# Patient Record
Sex: FEMALE | Race: WHITE | Employment: FULL TIME | URBAN - METROPOLITAN AREA
[De-identification: names, ages, dates, MRNs, and addresses within clinical notes are randomized per-mention and may not be internally consistent; named-entity substitution may affect disease eponyms.]

---

## 2017-02-21 ENCOUNTER — OFFICE VISIT (OUTPATIENT)
Dept: PEDIATRICS | Facility: CLINIC | Age: 52
End: 2017-02-21
Payer: COMMERCIAL

## 2017-02-21 VITALS
HEIGHT: 64 IN | WEIGHT: 129.1 LBS | HEART RATE: 73 BPM | DIASTOLIC BLOOD PRESSURE: 70 MMHG | TEMPERATURE: 97 F | BODY MASS INDEX: 22.04 KG/M2 | SYSTOLIC BLOOD PRESSURE: 130 MMHG

## 2017-02-21 DIAGNOSIS — Z00.00 ENCOUNTER FOR ROUTINE ADULT HEALTH EXAMINATION WITHOUT ABNORMAL FINDINGS: Primary | ICD-10-CM

## 2017-02-21 DIAGNOSIS — Z13.6 CARDIOVASCULAR SCREENING; LDL GOAL LESS THAN 160: ICD-10-CM

## 2017-02-21 DIAGNOSIS — F41.8 DEPRESSION WITH ANXIETY: ICD-10-CM

## 2017-02-21 DIAGNOSIS — Z12.11 SCREENING FOR COLON CANCER: ICD-10-CM

## 2017-02-21 DIAGNOSIS — Z11.59 NEED FOR HEPATITIS C SCREENING TEST: ICD-10-CM

## 2017-02-21 DIAGNOSIS — Z12.4 SCREENING FOR MALIGNANT NEOPLASM OF CERVIX: ICD-10-CM

## 2017-02-21 DIAGNOSIS — Z13.1 SCREENING FOR DIABETES MELLITUS: ICD-10-CM

## 2017-02-21 DIAGNOSIS — L71.9 ROSACEA: ICD-10-CM

## 2017-02-21 DIAGNOSIS — Z13.29 SCREENING FOR THYROID DISORDER: ICD-10-CM

## 2017-02-21 DIAGNOSIS — Z12.31 ENCOUNTER FOR SCREENING MAMMOGRAM FOR BREAST CANCER: ICD-10-CM

## 2017-02-21 PROCEDURE — 87624 HPV HI-RISK TYP POOLED RSLT: CPT | Performed by: NURSE PRACTITIONER

## 2017-02-21 PROCEDURE — 99213 OFFICE O/P EST LOW 20 MIN: CPT | Mod: 25 | Performed by: NURSE PRACTITIONER

## 2017-02-21 PROCEDURE — 99396 PREV VISIT EST AGE 40-64: CPT | Performed by: NURSE PRACTITIONER

## 2017-02-21 PROCEDURE — G0145 SCR C/V CYTO,THINLAYER,RESCR: HCPCS | Performed by: NURSE PRACTITIONER

## 2017-02-21 RX ORDER — BUPROPION HYDROCHLORIDE 150 MG/1
150 TABLET ORAL EVERY MORNING
Qty: 30 TABLET | Refills: 1 | Status: SHIPPED | OUTPATIENT
Start: 2017-02-21 | End: 2017-06-15

## 2017-02-21 RX ORDER — DOXYCYCLINE 100 MG/1
100 CAPSULE ORAL DAILY
Qty: 90 CAPSULE | Refills: 3 | Status: SHIPPED | OUTPATIENT
Start: 2017-02-21 | End: 2018-03-25

## 2017-02-21 ASSESSMENT — ANXIETY QUESTIONNAIRES
GAD7 TOTAL SCORE: 11
IF YOU CHECKED OFF ANY PROBLEMS ON THIS QUESTIONNAIRE, HOW DIFFICULT HAVE THESE PROBLEMS MADE IT FOR YOU TO DO YOUR WORK, TAKE CARE OF THINGS AT HOME, OR GET ALONG WITH OTHER PEOPLE: VERY DIFFICULT
1. FEELING NERVOUS, ANXIOUS, OR ON EDGE: NEARLY EVERY DAY
7. FEELING AFRAID AS IF SOMETHING AWFUL MIGHT HAPPEN: MORE THAN HALF THE DAYS
6. BECOMING EASILY ANNOYED OR IRRITABLE: SEVERAL DAYS
5. BEING SO RESTLESS THAT IT IS HARD TO SIT STILL: NOT AT ALL
2. NOT BEING ABLE TO STOP OR CONTROL WORRYING: MORE THAN HALF THE DAYS
3. WORRYING TOO MUCH ABOUT DIFFERENT THINGS: MORE THAN HALF THE DAYS

## 2017-02-21 ASSESSMENT — PATIENT HEALTH QUESTIONNAIRE - PHQ9: 5. POOR APPETITE OR OVEREATING: SEVERAL DAYS

## 2017-02-21 NOTE — PATIENT INSTRUCTIONS
It was a pleasure seeing you today at the [facility]. Thank you for allowing us to care for you today. We truly hope we provided you with the excellent service you deserve. Please let us know if there is anything else we can do for you so we can be sure you are leaving completley satisfied with your care experience.       General information about your clinic   Clinic Hours Lab Hours (Appointments are required)   Mon-Thurs: 7:30 AM - 7 PM Mon-Thurs: 7:30 AM - 7 PM   Fri: 7:30 AM - 5 PM Fri: 7:30 AM - 5 PM        After Hours Nurse Advise & Appts:  [facility] Nurse Advisors: [phone]  [facility] On Call: to make appointments anytime: [phone] On Call Physician: call [phone] and answering service will page the on call physician.        For urgent appointments, please call [phone] and ask for the triage nurse or your care team clinic nurse.  How to contact my care team:  [facility]: [email or web address]   Phone: [phone]   Fax: [phone]       [facility]:   Phone: [phone]  Hours: 8:00 AM - 6:00 PM  Medication Refills:  Call your pharmacy and they will forward the refill to us. Please allow 3 business days for your refills to be completed.       Normal or non-critical lab and imaging results will be communicated to you by MyChart, letter or phone within 7 days.  If you do not hear from us within 10 days, please call the clinic. If you have a critical or abnormal lab result, we will notify you by phone as soon as possible.       We now have PWIC (Pediatric Walk in Care)  Monday-Friday from 7:30-4. Simply walk in and be seen for your urgent needs like cough, fever, rash, diarrhea or vomiting, pink eye, UTI. No appointments needed. Ask one of the team for more information      -Your Care Team:    Dr. Rogelio Angel - Internal Medicine/Pediatrics   Dr. Cruz Olivas - Family Medicine  Dr. Carlee San - Pediatrics  Zoraida Leal CNP - Family Practice Nurse  Practitioner         PLAN:   1.   Symptomatic therapy suggested:   Will start on the Wellbutrin once a day.  2.  Orders Placed This Encounter   Medications     buPROPion (WELLBUTRIN XL) 150 MG 24 hr tablet     Sig: Take 1 tablet (150 mg) by mouth every morning     Dispense:  30 tablet     Refill:  1     doxycycline (VIBRAMYCIN) 100 MG capsule     Sig: Take 1 capsule (100 mg) by mouth daily     Dispense:  90 capsule     Refill:  3     Orders Placed This Encounter   Procedures     MA Screening Digital Bilateral     Pap imaged thin layer screen with HPV - recommended age 30 - 65 years (select HPV order below)     HPV High Risk Types DNA Cervical     Lipid panel reflex to direct LDL     Comprehensive metabolic panel     Vitamin D Deficiency     Hepatitis C Screen Reflex to HCV RNA Quant and Genotype     TSH with free T4 reflex     JUST IN CASE     MENTAL HEALTH REFERRAL     GASTROENTEROLOGY ADULT REF PROCEDURE ONLY       3. Patient needs to follow up in if no improvement,or sooner if worsening of symptoms or other symptoms develop.  FURTHER TESTING:       - mammogram  CONSULTATION/REFERRAL to Counseling and for colonoscopy  Please call [phone] to make appointment  if you do not hear from referrals in the next few days.   FUTURE LABS:       - Schedule a fasting blood draw   Follow up in 1 month.  Follow up office visit in one year for annual health maintenance exam, sooner PRN.    Preventive Health Recommendations  Female Ages 50 - 64    Yearly exam: See your health care provider every year in order to  o Review health changes.   o Discuss preventive care.    o Review your medicines if your doctor has prescribed any.      Get a Pap test every three years (unless you have an abnormal result and your provider advises testing more often).    If you get Pap tests with HPV test, you only need to test every 5 years, unless you have an abnormal result.     You do not need a Pap test if your uterus was removed  (hysterectomy) and you have not had cancer.    You should be tested each year for STDs (sexually transmitted diseases) if you're at risk.     Have a mammogram every 1 to 2 years.    Have a colonoscopy at age 50, or have a yearly FIT test (stool test). These exams screen for colon cancer.      Have a cholesterol test every 5 years, or more often if advised.    Have a diabetes test (fasting glucose) every three years. If you are at risk for diabetes, you should have this test more often.     If you are at risk for osteoporosis (brittle bone disease), think about having a bone density scan (DEXA).    Shots: Get a flu shot each year. Get a tetanus shot every 10 years.    Nutrition:     Eat at least 5 servings of fruits and vegetables each day.    Eat whole-grain bread, whole-wheat pasta and brown rice instead of white grains and rice.    Talk to your provider about Calcium and Vitamin D.     Lifestyle    Exercise at least 150 minutes a week (30 minutes a day, 5 days a week). This will help you control your weight and prevent disease.    Limit alcohol to one drink per day.    No smoking.     Wear sunscreen to prevent skin cancer.     See your dentist every six months for an exam and cleaning.    See your eye doctor every 1 to 2 years.

## 2017-02-21 NOTE — LETTER
[address] [address]  [phone]      February 27, 2017    Minerva Rodríguez  [address] [place] [address] [address] [place] [address]    Dear Minerva,  We are happy to inform you that your PAP smear result from 2/21/17 is normal.  We are now able to do a follow up test on PAP smears. The DNA test is for HPV (Human Papilloma Virus). Cervical cancer is closely linked with certain types of HPV. Your result showed no evidence of high risk HPV.  Therefore we recommend you return in 3 years for your next pap smear.  You will still need to return to the clinic every year for an annual exam and other preventive tests.  Please contact the clinic with any questions.  Sincerely,  ANGI Simon CNP/rlm

## 2017-02-21 NOTE — MR AVS SNAPSHOT
After Visit Summary   2/21/2017    Minerva Rodríguez    MRN: [id number]           Patient Information     Date Of Birth          1965        Visit Information        Provider Department      2/21/2017 11:00 AM Zoraida Leal APRN CNP [facility]        Today's Diagnoses     Encounter for routine adult health examination without abnormal findings    -  1    Encounter for screening mammogram for breast cancer        Need for hepatitis C screening test        Screening for malignant neoplasm of cervix        Screening for diabetes mellitus        CARDIOVASCULAR SCREENING; LDL GOAL LESS THAN 160        Screening for colon cancer        Screening for thyroid disorder        Depression with anxiety        Rosacea          Care Instructions    It was a pleasure seeing you today at the [facility]. Thank you for allowing us to care for you today. We truly hope we provided you with the excellent service you deserve. Please let us know if there is anything else we can do for you so we can be sure you are leaving completley satisfied with your care experience.       General information about your clinic   Clinic Hours Lab Hours (Appointments are required)   Mon-Thurs: 7:30 AM - 7 PM Mon-Thurs: 7:30 AM - 7 PM   Fri: 7:30 AM - 5 PM Fri: 7:30 AM - 5 PM        After Hours Nurse Advise & Appts:  [facility] Nurse Advisors: [phone]  [facility] On Call: to make appointments anytime: [phone] On Call Physician: call [phone] and answering service will page the on call physician.        For urgent appointments, please call [phone] and ask for the triage nurse or your care team clinic nurse.  How to contact my care team:  MyChart: [email or web address]   Phone: [phone]   Fax: [phone]       [facility]:   Phone: [phone]  Hours: 8:00 AM - 6:00 PM  Medication Refills:  Call your pharmacy and they will forward the refill to us. Please  allow 3 business days for your refills to be completed.       Normal or non-critical lab and imaging results will be communicated to you by MyChart, letter or phone within 7 days.  If you do not hear from us within 10 days, please call the clinic. If you have a critical or abnormal lab result, we will notify you by phone as soon as possible.       We now have PWIC (Pediatric Walk in Care)  Monday-Friday from 7:30-4. Simply walk in and be seen for your urgent needs like cough, fever, rash, diarrhea or vomiting, pink eye, UTI. No appointments needed. Ask one of the team for more information      -Your Care Team:    Dr. Rogelio Angel - Internal Medicine/Pediatrics   Dr. Cruz Olivas - Family Medicine  Dr. Carlee San - Pediatrics  Zoraida Leal CNP - Family Practice Nurse Practitioner         PLAN:   1.   Symptomatic therapy suggested:   Will start on the Wellbutrin once a day.  2.  Orders Placed This Encounter   Medications     buPROPion (WELLBUTRIN XL) 150 MG 24 hr tablet     Sig: Take 1 tablet (150 mg) by mouth every morning     Dispense:  30 tablet     Refill:  1     doxycycline (VIBRAMYCIN) 100 MG capsule     Sig: Take 1 capsule (100 mg) by mouth daily     Dispense:  90 capsule     Refill:  3     Orders Placed This Encounter   Procedures     MA Screening Digital Bilateral     Pap imaged thin layer screen with HPV - recommended age 30 - 65 years (select HPV order below)     HPV High Risk Types DNA Cervical     Lipid panel reflex to direct LDL     Comprehensive metabolic panel     Vitamin D Deficiency     Hepatitis C Screen Reflex to HCV RNA Quant and Genotype     TSH with free T4 reflex     JUST IN CASE     MENTAL HEALTH REFERRAL     GASTROENTEROLOGY ADULT REF PROCEDURE ONLY       3. Patient needs to follow up in if no improvement,or sooner if worsening of symptoms or other symptoms develop.  FURTHER TESTING:       - mammogram  CONSULTATION/REFERRAL to Counseling and for colonoscopy  Please call [phone] to  make appointment  if you do not hear from referrals in the next few days.   FUTURE LABS:       - Schedule a fasting blood draw   Follow up in 1 month.  Follow up office visit in one year for annual health maintenance exam, sooner PRN.    Preventive Health Recommendations  Female Ages 50 - 64    Yearly exam: See your health care provider every year in order to  o Review health changes.   o Discuss preventive care.    o Review your medicines if your doctor has prescribed any.      Get a Pap test every three years (unless you have an abnormal result and your provider advises testing more often).    If you get Pap tests with HPV test, you only need to test every 5 years, unless you have an abnormal result.     You do not need a Pap test if your uterus was removed (hysterectomy) and you have not had cancer.    You should be tested each year for STDs (sexually transmitted diseases) if you're at risk.     Have a mammogram every 1 to 2 years.    Have a colonoscopy at age 50, or have a yearly FIT test (stool test). These exams screen for colon cancer.      Have a cholesterol test every 5 years, or more often if advised.    Have a diabetes test (fasting glucose) every three years. If you are at risk for diabetes, you should have this test more often.     If you are at risk for osteoporosis (brittle bone disease), think about having a bone density scan (DEXA).    Shots: Get a flu shot each year. Get a tetanus shot every 10 years.    Nutrition:     Eat at least 5 servings of fruits and vegetables each day.    Eat whole-grain bread, whole-wheat pasta and brown rice instead of white grains and rice.    Talk to your provider about Calcium and Vitamin D.     Lifestyle    Exercise at least 150 minutes a week (30 minutes a day, 5 days a week). This will help you control your weight and prevent disease.    Limit alcohol to one drink per day.    No smoking.     Wear sunscreen to prevent skin cancer.     See your dentist every six  months for an exam and cleaning.    See your eye doctor every 1 to 2 years.          Follow-ups after your visit        Additional Services     GASTROENTEROLOGY ADULT REF PROCEDURE ONLY       Last Lab Result: No results found for: CR  Body mass index is 21.99 kg/(m^2).     Needed:  No  Language:  English    Patient will be contacted to schedule procedure.     Please be aware that coverage of these services is subject to the terms and limitations of your health insurance plan.  Call member services at your health plan with any benefit or coverage questions.  Any procedures must be performed at a [facility] facility OR coordinated by your clinic's referral office.    Please bring the following with you to your appointment:    (1) Any X-Rays, CTs or MRIs which have been performed.  Contact the facility where they were done to arrange for  prior to your scheduled appointment.    (2) List of current medications   (3) This referral request   (4) Any documents/labs given to you for this referral            MENTAL HEALTH REFERRAL       Your provider has referred you to: PREFERRED PROVIDERS:  [facility] Providers Intake Scheduling [phone]   [email or web address]  [facility]   All scheduling is subject to the client's specific insurance plan & benefits, provider/location availability, and provider clinical specialities.  Please arrive 15 minutes early for your first appointment and bring your completed paperwork.    Please be aware that coverage of these services is subject to the terms and limitations of your health insurance plan.  Call member services at your health plan with any benefit or coverage questions.                  Future tests that were ordered for you today     Open Future Orders        Priority Expected Expires Ordered    Lipid panel reflex to direct LDL Routine  3/21/2017 2/21/2017    Comprehensive metabolic panel Routine  3/21/2017 2/21/2017    Vitamin D Deficiency  Routine  3/21/2017 2017    Hepatitis C Screen Reflex to HCV RNA Quant and Genotype Routine  3/21/2017 2017    TSH with free T4 reflex Routine  3/21/2017 2017    JUST IN CASE Routine  3/21/2017 2017    MA Screening Digital Bilateral Routine  2018            Who to contact     If you have questions or need follow up information about today's clinic visit or your schedule please contact [facility] directly at [phone].  Normal or non-critical lab and imaging results will be communicated to you by Marqetahart, letter or phone within 4 business days after the clinic has received the results. If you do not hear from us within 7 days, please contact the clinic through [employer] or phone. If you have a critical or abnormal lab result, we will notify you by phone as soon as possible.  Submit refill requests through Zwittle or call your pharmacy and they will forward the refill request to us. Please allow 3 business days for your refill to be completed.          Additional Information About Your Visit        Zwittle Information     Zwittle is an electronic gateway that provides easy, online access to your medical records. With Zwittle, you can request a clinic appointment, read your test results, renew a prescription or communicate with your care team.     To sign up for Zwittle visit the website at [email or web address]   You will be asked to enter the access code listed below, as well as some personal information. Please follow the directions to create your username and password.     Your access code is: [email or web address]  Expires: 2017 11:57 AM     Your access code will  in 90 days. If you need help or a new code, please contact your [facility] or call [phone] for assistance.        Care EveryWhere ID     This is your Care EveryWhere ID. This could be used by other organizations to access your [facility]  "records  [id number]        Your Vitals Were     Pulse Temperature Height Breastfeeding? BMI (Body Mass Index)       73 97  F (36.1  C) (Temporal) 5' 4.25\" (1.632 m) No 21.99 kg/m2        Blood Pressure from Last 3 Encounters:   02/21/17 130/70   07/01/16 136/76   01/19/16 130/70    Weight from Last 3 Encounters:   02/21/17 129 lb 1.6 oz (58.6 kg)   07/01/16 129 lb 14.4 oz (58.9 kg)   01/19/16 130 lb 11.2 oz (59.3 kg)              We Performed the Following     GASTROENTEROLOGY ADULT REF PROCEDURE ONLY     HPV High Risk Types DNA Cervical     MENTAL HEALTH REFERRAL     Pap imaged thin layer screen with HPV - recommended age 30 - 65 years (select HPV order below)          Today's Medication Changes          These changes are accurate as of: 2/21/17 11:57 AM.  If you have any questions, ask your nurse or doctor.               Start taking these medicines.        Dose/Directions    buPROPion 150 MG 24 hr tablet   Commonly known as:  WELLBUTRIN XL   Used for:  Depression with anxiety   Started by:  Zoraida Leal APRN CNP        Dose:  150 mg   Take 1 tablet (150 mg) by mouth every morning   Quantity:  30 tablet   Refills:  1         These medicines have changed or have updated prescriptions.        Dose/Directions    doxycycline 100 MG capsule   Commonly known as:  VIBRAMYCIN   This may have changed:  See the new instructions.   Used for:  Rosacea   Changed by:  Zoraida Leal APRN CNP        Dose:  100 mg   Take 1 capsule (100 mg) by mouth daily   Quantity:  90 capsule   Refills:  3            Where to get your medicines      These medications were sent to [facility] [address] [address] [employer] - [facility] [address] [facility]  [address] [facility] [address]     Phone:  [phone]     buPROPion 150 MG 24 hr tablet    doxycycline 100 MG capsule                Primary Care Provider Office Phone # Fax #    Rogelio Angel [phone]       [facility] [address]  [facility] [place] [address]        Thank you!     Thank you for choosing [facility]  for your care. Our goal is always to provide you with excellent care. Hearing back from our patients is one way we can continue to improve our services. Please take a few minutes to complete the written survey that you may receive in the mail after your visit with us. Thank you!             Your Updated Medication List - Protect others around you: Learn how to safely use, store and throw away your medicines at www.disposemymeds.org.          This list is accurate as of: 2/21/17 11:57 AM.  Always use your most recent med list.                   Brand Name Dispense Instructions for use    buPROPion 150 MG 24 hr tablet    WELLBUTRIN XL    30 tablet    Take 1 tablet (150 mg) by mouth every morning       doxycycline 100 MG capsule    VIBRAMYCIN    90 capsule    Take 1 capsule (100 mg) by mouth daily

## 2017-02-21 NOTE — PROGRESS NOTES
SUBJECTIVE:     CC: Minerva Rodríguez is an 51 year old woman who presents for preventive health visit.     Healthy Habits:    Do you get at least three servings of calcium containing foods daily (dairy, green leafy vegetables, etc.)? yes    Amount of exercise or daily activities, outside of work: active at work    Problems taking medications regularly not applicable    Medication side effects: No    Have you had an eye exam in the past two years? no    Do you see a dentist twice per year? no  Do you have sleep apnea, excessive snoring or daytime drowsiness?no    PROBLEMS TO ADD ON...  Also, she has additional complaints of :  PROBLEMS TO ADD ON...    Depression and Anxiety Follow-Up    Status since last visit: Worsened     Was on medicine about a year ago and did not tolerate because of dizziness     Other associated symptoms:anxiety     Complicating factors:     Significant life event: No     Current substance abuse: Alcohol    PHQ-9 SCORE 1/19/2016   Total Score 14     BHARAT-7 SCORE 1/19/2016   Total Score 8        PHQ-9  English      PHQ-9   Any Language     GAD7       Today's PHQ-2 Score:   PHQ-2 ( 1999 [employer]) 2/21/2017 1/19/2016   Q1: Little interest or pleasure in doing things 3 3   Q2: Feeling down, depressed or hopeless 3 3   PHQ-2 Score 6 6       Abuse: Current or Past(Physical, Sexual or Emotional)- No  Do you feel safe in your environment - Yes    Social History   Substance Use Topics     Smoking status: Never Smoker     Smokeless tobacco: Never Used     Alcohol use Yes     The patient does not drink >3 drinks per day nor >7 drinks per week. patient note sometimes she will have more then 3 drinks a night, it varies.    No results for input(s): CHOL, HDL, LDL, TRIG, CHOLHDLRATIO, NHDL in the last 99064 hours.    Reviewed orders with patient.  Reviewed health maintenance and updated orders accordingly - Yes    Mammo Decision Support:  Patient over age 50, mutual decision to screen reflected in health  maintenance.    Pertinent mammograms are reviewed under the imaging tab.  History of abnormal Pap smear: NO - age 30- 65 PAP every 3 years recommended  All Histories reviewed and updated in Epic.  Past Medical History   Diagnosis Date     Alcohol dependence (H)      Depression with anxiety 1/19/2016     Hypertension 2/20/2009     Major depression in remission (H) 2/20/2009      Past Surgical History   Procedure Laterality Date     No history of surgery         ROS:  CONSTITUTIONAL:NEGATIVE for fever, chills, change in weight  INTEGUMENTARY/SKIN: NEGATIVE for worrisome rashes, moles or lesions. Has rosacea and responded well to doxycycline   EYES: NEGATIVE for vision changes or irritation  ENT: NEGATIVE for ear, mouth and throat problems  RESP:NEGATIVE for significant cough or SOB  BREAST: NEGATIVE for masses, tenderness or discharge  CV: NEGATIVE for chest pain, palpitations or peripheral edema  GI: NEGATIVE for nausea, abdominal pain, heartburn, or change in bowel habits   menopausal female: amenorrhea, no unusual urinary symptoms and no unusual vaginal symptoms  MUSCULOSKELETAL:NEGATIVE for significant arthralgias or myalgia  NEURO: NEGATIVE for weakness, dizziness or paresthesias  ENDOCRINE: NEGATIVE for temperature intolerance, skin/hair changes  HEME/ALLERGY/IMMUNE: NEGATIVE for allergies, anemia and bleeding disorder  PSYCHIATRIC: POSITIVE foralcohol abuse, anxiety, depressed mood and Hx depression and NEGATIVE fordrug usage, thoughts of hurting someone else and thoughts of self harm       Patient Active Problem List   Diagnosis     Depression with anxiety     Alcohol dependence (H)     Hypertension     Major depression in remission (H)     Past Surgical History   Procedure Laterality Date     No history of surgery         Social History   Substance Use Topics     Smoking status: Never Smoker     Smokeless tobacco: Never Used     Alcohol use 25.2 oz/week     42 Cans of beer per week     Family History  "  Problem Relation Age of Onset     Hypertension Father      Depression Father      Substance Abuse Father      Depression Brother      CANCER Maternal Aunt      Other Cancer Maternal Aunt      DIABETES No family hx of      Coronary Artery Disease No family hx of      Hyperlipidemia No family hx of      CEREBROVASCULAR DISEASE No family hx of      Breast Cancer No family hx of      Colon Cancer No family hx of      Asthma No family hx of      Thyroid Disease No family hx of          No current outpatient prescriptions on file.     No Known Allergies  OBJECTIVE:     /70 (BP Location: Right arm, Patient Position: Chair, Cuff Size: Adult Regular)  Pulse 73  Temp 97  F (36.1  C) (Temporal)  Ht 5' 4.25\" (1.632 m)  Wt 129 lb 1.6 oz (58.6 kg)  Breastfeeding? No  BMI 21.99 kg/m2  EXAM:  GENERAL: healthy, alert and no distress  EYES: Eyes grossly normal to inspection, PERRL and conjunctivae and sclerae normal  HENT: ear canals and TM's normal, nose and mouth without ulcers or lesions  NECK: no adenopathy, no asymmetry, masses, or scars and thyroid normal to palpation  RESP: lungs clear to auscultation - no rales, rhonchi or wheezes  BREAST: normal without masses, tenderness or nipple discharge and no palpable axillary masses or adenopathy  CV: regular rate and rhythm, normal S1 S2, no S3 or S4, no murmur, click or rub, no peripheral edema and peripheral pulses strong  ABDOMEN: soft, nontender, no hepatosplenomegaly, no masses and bowel sounds normal  MS: no gross musculoskeletal defects noted, no edema  SKIN: negatives: color normal, mobility and turgor normal, positives: SKIN: Diffuse erythema and dilation of superficial vessels on nose and cheeks.  Papules not  present.  NEURO: Normal strength and tone, mentation intact and speech normal  PSYCH: mentation appears normal, affect normal/bright  LYMPH: no cervical, supraclavicular, axillary, or inguinal adenopathy    ASSESSMENT/PLAN:     Minerva was seen today for " physical.    Diagnoses and all orders for this visit:    Encounter for routine adult health examination without abnormal findings  -     Lipid panel reflex to direct LDL; Future  -     Comprehensive metabolic panel; Future  -     Vitamin D Deficiency; Future  -     TSH with free T4 reflex; Future  -     JUST IN CASE; Future    Depression with anxiety  -     buPROPion (WELLBUTRIN XL) 150 MG 24 hr tablet; Take 1 tablet (150 mg) by mouth every morning  -     MENTAL HEALTH REFERRAL  Long discussion regarding the nature of depression including the internal vs external triggers.  Discussed risks, benefits, side effects, and alternatives of therapy. Would continue the present course of therapy.  Discussed the idea of a trial off meds at some point, usually in the spring for most chance of success.  Pt would like to continue on meds for now.  Initiate medication with Wellbutrin   Reviewed concept of depression as function of biochemical imbalance of neurotransmitters/rationale for treatment.  Risks and benefits of medication(s) reviewed with patient.  Questions answered.  Counseling advised  Refer to psychologist  Followup appointment in 1 month(s)  Patient instructed to call for significant side effects medications or problems  Patient advised immediate presentation to hospital for suicidal thought, etc.  No-harm verbal contract agreed upon   Audit C Alcohol Screening Tool  AUDIT   Questions 0 1 2 3 4 Score   1. How often do you have a drink  containing alcohol? Never Monthly or less 2 to 4  times a  month 2 to 3  times a  week 4 or more  times a  week 4   2. How many drinks containing alcohol  do you have on a typical day when you are drinking? 1 or 2 3 or 4 5 or 6 7 to 9 10 or more 2   3. How often do you have more than five  or more drinks on one occasion? Never Less  than  monthly Monthly Weekly Daily or  almost  daily 4     Scoring: A score of 4 for adult men or 3 for adult women is an indication of hazardous drinking  (risk for  physical or physiological harm). A score of 5 or more for adult men or 4 or more for adult women is an  indication of an alcohol use disorder.    Patient states has been in AA before but has never seen a counselor  Is willing to do this and discussed need to decrease her alcohol consumption     Encounter for screening mammogram for breast cancer  -     MA Screening Digital Bilateral; Future    Need for hepatitis C screening test  -     Hepatitis C Screen Reflex to HCV RNA Quant and Genotype; Future    Screening for malignant neoplasm of cervix  -     Pap imaged thin layer screen with HPV - recommended age 30 - 65 years (select HPV order below)  -     HPV High Risk Types DNA Cervical    Screening for diabetes mellitus  -     Comprehensive metabolic panel; Future    CARDIOVASCULAR SCREENING; LDL GOAL LESS THAN 160  -     Lipid panel reflex to direct LDL; Future    Screening for colon cancer  -     GASTROENTEROLOGY ADULT REF PROCEDURE ONLY    Screening for thyroid disorder  -     TSH with free T4 reflex; Future    Rosacea  -     doxycycline (VIBRAMYCIN) 100 MG capsule; Take 1 capsule (100 mg) by mouth daily  Continue current medications.    PLAN:    Patient needs to follow up in if no improvement,or sooner if worsening of symptoms or other symptoms develop.  FURTHER TESTING:       - mammogram  CONSULTATION/REFERRAL to Counseling and for colonoscopy  Please call [phone] to make appointment  if you do not hear from referrals in the next few days.   FUTURE LABS:       - Schedule a fasting blood draw   Follow up in 1 month.  Follow up office visit in one year for annual health maintenance exam, sooner PRN.          COUNSELING:   Reviewed preventive health counseling, as reflected in patient instructions  Special attention given to:        Regular exercise       Healthy diet/nutrition       Vision screening       Alcohol Use       Osteoporosis Prevention/Bone Health       Colon cancer screening        "Consider Hep C screening for patients born between 1945 and 1965                reports that she has never smoked. She has never used smokeless tobacco.    Estimated body mass index is 21.99 kg/(m^2) as calculated from the following:    Height as of this encounter: 5' 4.25\" (1.632 m).    Weight as of this encounter: 129 lb 1.6 oz (58.6 kg).       Counseling Resources:  ATP IV Guidelines  Pooled Cohorts Equation Calculator  Breast Cancer Risk Calculator  FRAX Risk Assessment  ICSI Preventive Guidelines  Dietary Guidelines for Americans, 2010  USDA's MyPlate  ASA Prophylaxis  Lung CA Screening    ANGI Simon CNP  [facility]  "

## 2017-02-21 NOTE — NURSING NOTE
"Chief Complaint   Patient presents with     Physical     AFE-not fasting today       Initial /70 (BP Location: Right arm, Patient Position: Chair, Cuff Size: Adult Regular)  Pulse 73  Temp 97  F (36.1  C) (Temporal)  Ht 5' 4.25\" (1.632 m)  Wt 129 lb 1.6 oz (58.6 kg)  Breastfeeding? No  BMI 21.99 kg/m2 Estimated body mass index is 21.99 kg/(m^2) as calculated from the following:    Height as of this encounter: 5' 4.25\" (1.632 m).    Weight as of this encounter: 129 lb 1.6 oz (58.6 kg).  Medication Reconciliation: complete      ANASTASIA Grant      "

## 2017-02-22 ASSESSMENT — ANXIETY QUESTIONNAIRES: GAD7 TOTAL SCORE: 11

## 2017-02-22 ASSESSMENT — PATIENT HEALTH QUESTIONNAIRE - PHQ9: SUM OF ALL RESPONSES TO PHQ QUESTIONS 1-9: 12

## 2017-02-23 LAB
COPATH REPORT: NORMAL
PAP: NORMAL

## 2017-02-24 ENCOUNTER — RADIANT APPOINTMENT (OUTPATIENT)
Dept: MAMMOGRAPHY | Facility: CLINIC | Age: 52
End: 2017-02-24
Attending: NURSE PRACTITIONER
Payer: COMMERCIAL

## 2017-02-24 DIAGNOSIS — Z12.31 ENCOUNTER FOR SCREENING MAMMOGRAM FOR BREAST CANCER: ICD-10-CM

## 2017-02-24 LAB
FINAL DIAGNOSIS: NORMAL
HPV HR 12 DNA CVX QL NAA+PROBE: NEGATIVE
HPV16 DNA SPEC QL NAA+PROBE: NEGATIVE
HPV18 DNA SPEC QL NAA+PROBE: NEGATIVE
SPECIMEN DESCRIPTION: NORMAL

## 2017-02-24 PROCEDURE — G0202 SCR MAMMO BI INCL CAD: HCPCS | Performed by: RADIOLOGY

## 2017-03-28 DIAGNOSIS — Z11.59 NEED FOR HEPATITIS C SCREENING TEST: ICD-10-CM

## 2017-03-28 DIAGNOSIS — Z13.29 SCREENING FOR THYROID DISORDER: ICD-10-CM

## 2017-03-28 DIAGNOSIS — Z00.00 ENCOUNTER FOR ROUTINE ADULT HEALTH EXAMINATION WITHOUT ABNORMAL FINDINGS: ICD-10-CM

## 2017-03-28 DIAGNOSIS — E55.9 VITAMIN D INSUFFICIENCY: Primary | ICD-10-CM

## 2017-03-28 DIAGNOSIS — Z13.6 CARDIOVASCULAR SCREENING; LDL GOAL LESS THAN 160: ICD-10-CM

## 2017-03-28 DIAGNOSIS — Z13.1 SCREENING FOR DIABETES MELLITUS: ICD-10-CM

## 2017-03-28 LAB
ALBUMIN SERPL-MCNC: 3.9 G/DL (ref 3.4–5)
ALP SERPL-CCNC: 64 U/L (ref 40–150)
ALT SERPL W P-5'-P-CCNC: 24 U/L (ref 0–50)
ANION GAP SERPL CALCULATED.3IONS-SCNC: 5 MMOL/L (ref 3–14)
AST SERPL W P-5'-P-CCNC: 17 U/L (ref 0–45)
BILIRUB SERPL-MCNC: 0.6 MG/DL (ref 0.2–1.3)
BUN SERPL-MCNC: 9 MG/DL (ref 7–30)
CALCIUM SERPL-MCNC: 8.9 MG/DL (ref 8.5–10.1)
CHLORIDE SERPL-SCNC: 100 MMOL/L (ref 94–109)
CHOLEST SERPL-MCNC: 260 MG/DL
CO2 SERPL-SCNC: 30 MMOL/L (ref 20–32)
CREAT SERPL-MCNC: 0.54 MG/DL (ref 0.52–1.04)
GFR SERPL CREATININE-BSD FRML MDRD: NORMAL ML/MIN/1.7M2
GLUCOSE SERPL-MCNC: 87 MG/DL (ref 70–99)
HDLC SERPL-MCNC: 120 MG/DL
LDLC SERPL CALC-MCNC: 127 MG/DL
NONHDLC SERPL-MCNC: 140 MG/DL
POTASSIUM SERPL-SCNC: 4.2 MMOL/L (ref 3.4–5.3)
PROT SERPL-MCNC: 7.5 G/DL (ref 6.8–8.8)
SODIUM SERPL-SCNC: 135 MMOL/L (ref 133–144)
TRIGL SERPL-MCNC: 67 MG/DL
TSH SERPL DL<=0.005 MIU/L-ACNC: 1.06 MU/L (ref 0.4–4)

## 2017-03-28 PROCEDURE — 36415 COLL VENOUS BLD VENIPUNCTURE: CPT | Performed by: NURSE PRACTITIONER

## 2017-03-28 PROCEDURE — 84443 ASSAY THYROID STIM HORMONE: CPT | Performed by: NURSE PRACTITIONER

## 2017-03-28 PROCEDURE — 82306 VITAMIN D 25 HYDROXY: CPT | Performed by: NURSE PRACTITIONER

## 2017-03-28 PROCEDURE — 86803 HEPATITIS C AB TEST: CPT | Performed by: NURSE PRACTITIONER

## 2017-03-28 PROCEDURE — 80053 COMPREHEN METABOLIC PANEL: CPT | Performed by: NURSE PRACTITIONER

## 2017-03-28 PROCEDURE — 80061 LIPID PANEL: CPT | Performed by: NURSE PRACTITIONER

## 2017-03-29 LAB
DEPRECATED CALCIDIOL+CALCIFEROL SERPL-MC: 8 UG/L (ref 20–75)
HCV AB SERPL QL IA: NORMAL

## 2017-03-30 ENCOUNTER — TELEPHONE (OUTPATIENT)
Dept: PEDIATRICS | Facility: CLINIC | Age: 52
End: 2017-03-30

## 2017-03-30 RX ORDER — ERGOCALCIFEROL 1.25 MG/1
50000 CAPSULE, LIQUID FILLED ORAL
Qty: 8 CAPSULE | Refills: 0 | Status: SHIPPED | OUTPATIENT
Start: 2017-03-30 | End: 2017-05-19

## 2017-03-30 NOTE — LETTER
April 4, 2017      Minerva Rodríguez  [address] [place] [address]              Dear Minerva,    We tried to reach you by phone but were not able to leave a message to discuss your recent lab tests.      -Liver and gallbladder tests are normal. (ALT,AST, Alk phos, bilirubin), kidney function is normal (Cr, GFR), Sodium is normal, Potassium is normal, Calcium is normal, Glucose is normal (diabetes screening test).     -LDL(bad) cholesterol level is elevated,  A diet high in fat and simple carbohydrates, genetics and being overweight can contribute to this. ADVISE: Exercise, a low fat, low carbohydrate diet and weight control are helpful to improve this.  Rechecking your fasting cholesterol panel in 6 months is recommended    -TSH (thyroid stimulating hormone) level is normal which indicates normal thyroid function.    -Hepatitis C antibody screen test shows no signs of a previous hepatitis C infection.    Vitamin D level is low and oral supplementation should be started.  ADVISE: start script for Vitamin D once a week for 8 weeks and then Vitamin D3 over the counter 2000 IU daily to maintain levels. I will send the script in for you.   In 2 months, please schedule a lab only appointment to recheck Vitamin D levels     Please contact us if you have any questions, [phone].      Sincerely,      ANGI Kelly CNP

## 2017-03-30 NOTE — TELEPHONE ENCOUNTER
Called patient to give results, there was no answer and no answering machine to leave message.  Will try again later.    Mariluz Manley RN, [facility]        Notes Recorded by Zoraida Lela APRN CNP on 3/30/2017 at 8:12 AM  Please call patient   -Liver and gallbladder tests are normal. (ALT,AST, Alk phos, bilirubin), kidney function is normal (Cr, GFR), Sodium is normal, Potassium is normal, Calcium is normal, Glucose is normal (diabetes screening test).   -LDL(bad) cholesterol level is elevated,  A diet high in fat and simple carbohydrates, genetics and being overweight can contribute to this. ADVISE: Exercise, a low fat, low carbohydrate diet and weight control are helpful to improve this.  Rechecking your fasting cholesterol panel in 6 months is recommended (Lipid w/ LDL reflex, DX:hyperlipidemia)  -TSH (thyroid stimulating hormone) level is normal which indicates normal thyroid function.  -Hepatitis C antibody screen test shows no signs of a previous hepatitis C infection.  Vitamin D level is low and oral supplementation should be started.  ADVISE: start script for Vitamin D once a week for 8 weeks and then Vitamin D3 over the counter 2000 IU daily to maintain levels. I will send the script in for you.   In 2 months, please schedule a lab only appointment to recheck Vitamin D levels (Vit D deficiency, DX: Vitamin D Deficiency)   Please contact us if you have any questions.

## 2017-03-30 NOTE — TELEPHONE ENCOUNTER
Called patient, no answer, no answering machine.  Will try again tomorrow.    Mariluz Manley RN, [facility] [place]

## 2017-03-30 NOTE — TELEPHONE ENCOUNTER
Called patient, no answer, no answering machine to leave voicemail. Will try again later.    Mariluz Manley RN, [facility]

## 2017-04-03 NOTE — TELEPHONE ENCOUNTER
Called patient, no answer and no answering machine.  Will try again later.    Mariluz Manley RN, [facility]

## 2017-04-04 NOTE — TELEPHONE ENCOUNTER
Called patient, no answer and no answering machine.  After multiple attempts to contact patient unsuccessfully, will send a letter.    Mariluz Manley RN, [facility]

## 2017-04-14 ENCOUNTER — SURGERY (OUTPATIENT)
Age: 52
End: 2017-04-14

## 2017-04-14 ENCOUNTER — HOSPITAL ENCOUNTER (OUTPATIENT)
Facility: AMBULATORY SURGERY CENTER | Age: 52
Discharge: HOME | End: 2017-04-14
Attending: INTERNAL MEDICINE | Admitting: INTERNAL MEDICINE
Payer: COMMERCIAL

## 2017-04-14 VITALS
TEMPERATURE: 97.5 F | OXYGEN SATURATION: 96 % | SYSTOLIC BLOOD PRESSURE: 101 MMHG | DIASTOLIC BLOOD PRESSURE: 70 MMHG | RESPIRATION RATE: 15 BRPM

## 2017-04-14 LAB — COLONOSCOPY: NORMAL

## 2017-04-14 PROCEDURE — 45378 DIAGNOSTIC COLONOSCOPY: CPT

## 2017-04-14 PROCEDURE — G8918 PT W/O PREOP ORDER IV AB PRO: HCPCS

## 2017-04-14 PROCEDURE — G8907 PT DOC NO EVENTS ON DISCHARG: HCPCS

## 2017-04-14 RX ORDER — FENTANYL CITRATE 50 UG/ML
INJECTION, SOLUTION INTRAMUSCULAR; INTRAVENOUS PRN
Status: DISCONTINUED | OUTPATIENT
Start: 2017-04-14 | End: 2017-04-14 | Stop reason: HOSPADM

## 2017-04-14 RX ORDER — LIDOCAINE 40 MG/G
CREAM TOPICAL
Status: DISCONTINUED | OUTPATIENT
Start: 2017-04-14 | End: 2017-04-15 | Stop reason: HOSPADM

## 2017-04-14 RX ORDER — ONDANSETRON 2 MG/ML
4 INJECTION INTRAMUSCULAR; INTRAVENOUS
Status: DISCONTINUED | OUTPATIENT
Start: 2017-04-14 | End: 2017-04-15 | Stop reason: HOSPADM

## 2017-04-14 RX ADMIN — FENTANYL CITRATE 100 MCG: 50 INJECTION, SOLUTION INTRAMUSCULAR; INTRAVENOUS at 09:36

## 2017-06-15 ENCOUNTER — TELEPHONE (OUTPATIENT)
Dept: PEDIATRICS | Facility: CLINIC | Age: 52
End: 2017-06-15

## 2017-06-15 DIAGNOSIS — F41.8 DEPRESSION WITH ANXIETY: ICD-10-CM

## 2017-06-15 RX ORDER — BUPROPION HYDROCHLORIDE 150 MG/1
150 TABLET ORAL EVERY MORNING
Qty: 30 TABLET | Refills: 1 | Status: SHIPPED | OUTPATIENT
Start: 2017-06-15 | End: 2017-06-20

## 2017-06-15 NOTE — TELEPHONE ENCOUNTER
[facility] Call Center    Phone Message    Name of Caller: SELF (Marcos Palma)    Phone Number: Home number on file [phone] (home)    Best time to return call: SOON    May a detailed message be left on voicemail: yes    Relation to patient: Self    Reason for Call: Medication Refill Request    Has the patient contacted the pharmacy for the refill? Yes   Name of medication being requested: doxycycline (VIBRAMYCIN) 100 MG capsule  Provider who prescribed the medication: Zoraida Leal  Pharmacy: [facility] [id number] [address]  Date medication is needed: As Soon As Possible         Action Taken: Message routed to:  Primary Care p [id number]

## 2017-06-15 NOTE — TELEPHONE ENCOUNTER
[facility] Call Center    Phone Message    Name of Caller: SELF (Marcos Palma)    Phone Number: Home number on file [phone] (home)    Best time to return call: SOON    May a detailed message be left on voicemail: yes    Relation to patient: Self    Reason for Call: Medication Refill Request    Has the patient contacted the pharmacy for the refill? Yes   Name of medication being requested: buPROPion (WELLBUTRIN XL) 150 MG 24 hr tablet  Provider who prescribed the medication: Zoraida Leal  Pharmacy: [facility] [address] [address]  Date medication is needed: As soon as possible         Action Taken: Message routed to:  Primary Care p [phone]

## 2017-06-15 NOTE — TELEPHONE ENCOUNTER
PHQ-9 SCORE 1/19/2016 2/21/2017 6/15/2017   Total Score 14 12 0       BHARAT-7 SCORE 1/19/2016 2/21/2017   Total Score 8 11       Refill completed.   However would recommend a follow up appointment

## 2017-06-15 NOTE — TELEPHONE ENCOUNTER
Patient completed PHQ9.  Patient states she is doing really well on this medication.    Gaby Ayala CMA    Routed to Holly Leal

## 2017-06-15 NOTE — TELEPHONE ENCOUNTER
buPROPion (WELLBUTRIN XL) 150 MG 24 hr tablet   Last Written Prescription Date: 02/21/17  Last Fill Quantity: 30 tablets; # refills: 1  Last Office Visit with [facility], P or [facility] prescribing provider:  02/21/17       Last PHQ-9 score on record=   PHQ-9 SCORE 2/21/2017   Total Score 12       Lab Results   Component Value Date    AST 17 03/28/2017     Lab Results   Component Value Date    ALT 24 03/28/2017     Routed to refill pool    Gaby Ayala CMA    Left message for patient to return call to Update a PHQ9 per Holly Leal.    Gaby Ayala CMA

## 2017-06-15 NOTE — TELEPHONE ENCOUNTER
Left message for patient to return call to clinic regarding medication.    Holly Leal would like an updated PHQ9    Gaby Ayala CMA

## 2017-06-15 NOTE — TELEPHONE ENCOUNTER
Attempt #1    Left message for patient to return call to clinic.  Need to do GAD7 over the phone.  Also need to schedule follow up appt in August with Holyl Leal.      Notify patient Wellbutrin refills sent to pharmacy.    Gaby Ayala CMA

## 2017-06-15 NOTE — TELEPHONE ENCOUNTER
Please see PHQ9 results for refill on Wellbutrin.  Patient states she does really well on this medication.    Gaby Ayala CMA    Routed to Holly Leal

## 2017-06-16 ASSESSMENT — PATIENT HEALTH QUESTIONNAIRE - PHQ9: SUM OF ALL RESPONSES TO PHQ QUESTIONS 1-9: 0

## 2017-06-19 NOTE — TELEPHONE ENCOUNTER
Routing encounter to Shania, procedure coordinator, to call patient to schedule medication recheck appointment.  Rosa Mandel, CMA

## 2017-06-20 RX ORDER — BUPROPION HYDROCHLORIDE 150 MG/1
150 TABLET ORAL EVERY MORNING
Qty: 90 TABLET | Refills: 1 | Status: SHIPPED | OUTPATIENT
Start: 2017-06-20 | End: 2018-01-09

## 2017-06-20 ASSESSMENT — ANXIETY QUESTIONNAIRES
GAD7 TOTAL SCORE: 2
7. FEELING AFRAID AS IF SOMETHING AWFUL MIGHT HAPPEN: SEVERAL DAYS
5. BEING SO RESTLESS THAT IT IS HARD TO SIT STILL: NOT AT ALL
6. BECOMING EASILY ANNOYED OR IRRITABLE: NOT AT ALL
1. FEELING NERVOUS, ANXIOUS, OR ON EDGE: NOT AT ALL
2. NOT BEING ABLE TO STOP OR CONTROL WORRYING: SEVERAL DAYS
IF YOU CHECKED OFF ANY PROBLEMS ON THIS QUESTIONNAIRE, HOW DIFFICULT HAVE THESE PROBLEMS MADE IT FOR YOU TO DO YOUR WORK, TAKE CARE OF THINGS AT HOME, OR GET ALONG WITH OTHER PEOPLE: SOMEWHAT DIFFICULT
3. WORRYING TOO MUCH ABOUT DIFFERENT THINGS: NOT AT ALL

## 2017-06-20 ASSESSMENT — PATIENT HEALTH QUESTIONNAIRE - PHQ9: 5. POOR APPETITE OR OVEREATING: NOT AT ALL

## 2017-06-20 NOTE — TELEPHONE ENCOUNTER
PHQ-9 SCORE 1/19/2016 2/21/2017 6/15/2017   Total Score 14 12 0       BHARAT-7 SCORE 1/19/2016 2/21/2017 6/20/2017   Total Score 8 11 2       Please let her know these look good will send in a refill for 6 months  Then should have a follow up

## 2017-06-20 NOTE — TELEPHONE ENCOUNTER
Patient needs GAD7 done over the phone.  Rerouting back to [facility] pool.

## 2017-06-21 ASSESSMENT — ANXIETY QUESTIONNAIRES: GAD7 TOTAL SCORE: 2

## 2018-01-09 DIAGNOSIS — F41.8 DEPRESSION WITH ANXIETY: ICD-10-CM

## 2018-01-09 NOTE — LETTER
January 10, 2018      Minerva Rodríguez  [address] [facility] [address]              Dear Minerva,      We recently received a call from your pharmacy requesting a refill of your medication. We have provided a 30 day refill of your medication, buPROPion (WELLBUTRIN XL) 150 MG 24 hr tablet, as requested.      A review of your chart indicates that your last office visit was on 2/21/2017.  An appointment is required twice yearly with your provider.    We have authorized one time 30 day refill of your medication to allow time for you to schedule.     Please call the clinic to schedule your appointment.    Thank you for taking an active role in your healthcare.      Sincerely,    [facility]

## 2018-01-10 RX ORDER — BUPROPION HYDROCHLORIDE 150 MG/1
TABLET ORAL
Qty: 30 TABLET | Refills: 0 | Status: SHIPPED | OUTPATIENT
Start: 2018-01-10 | End: 2018-08-09 | Stop reason: DRUGHIGH

## 2018-01-10 NOTE — TELEPHONE ENCOUNTER
buPROPion (WELLBUTRIN XL) 150 MG 24 hr tablet  Last Written Prescription Date:  6/20/2017  Last Fill Quantity: 90,  # refills: 1   Last Office Visit with [facility], P or [facility] prescribing provider:  2/21/2017   Future Office Visit:       PHQ-9 score:    PHQ-9 SCORE 6/15/2017   Total Score 0       SSRIs Protocol Failed1/9 1:02 AM   PHQ-9 score less than 5 in past 6 months    Recent (6 mo) or future visit with authorizing provider's specialty    Recent or future visit with authorizing provider    Medication is Bupropion    Patient is age 18 or older    No active pregnancy on record    No positive pregnancy test in last 12 months     Per OV from 2/21/2017 patient was to return in 1 month for follow up.     Gemma refill. Letter sent to patient. Nina Brewer RN

## 2018-03-25 DIAGNOSIS — L71.9 ROSACEA: ICD-10-CM

## 2018-03-25 NOTE — LETTER
March 26, 2018      Minerva Rodríguez  [address] [facility] [address]              Dear Minerva,      We recently received a call from your pharmacy requesting a refill of your medication. We have provided a 30 day refill of your medication, doxycycline (VIBRAMYCIN) 100 MG capsule, as requested.    A review of your chart indicates that your last office visit was on 2/21/2017.  An appointment is required yearly with your provider.    We have authorized a one time refill of your medication to allow time for you to schedule.     Please call the clinic to schedule your appointment.    Thank you for taking an active role in your healthcare.      Sincerely,    [facility]

## 2018-03-26 ENCOUNTER — OFFICE VISIT (OUTPATIENT)
Dept: FAMILY MEDICINE | Facility: CLINIC | Age: 53
End: 2018-03-26
Payer: COMMERCIAL

## 2018-03-26 VITALS
HEIGHT: 64 IN | HEART RATE: 89 BPM | DIASTOLIC BLOOD PRESSURE: 88 MMHG | SYSTOLIC BLOOD PRESSURE: 150 MMHG | BODY MASS INDEX: 21.34 KG/M2 | OXYGEN SATURATION: 97 % | WEIGHT: 125 LBS | TEMPERATURE: 97.7 F

## 2018-03-26 DIAGNOSIS — L71.9 ROSACEA: Primary | ICD-10-CM

## 2018-03-26 PROCEDURE — 99213 OFFICE O/P EST LOW 20 MIN: CPT | Performed by: PHYSICIAN ASSISTANT

## 2018-03-26 RX ORDER — SULFACETAMIDE SODIUM 100 MG/ML
LOTION TOPICAL
Qty: 1 BOTTLE | Refills: 3 | Status: SHIPPED | OUTPATIENT
Start: 2018-03-26 | End: 2018-04-25

## 2018-03-26 RX ORDER — METRONIDAZOLE 7.5 MG/G
GEL TOPICAL 2 TIMES DAILY
Qty: 60 G | Refills: 3 | Status: SHIPPED | OUTPATIENT
Start: 2018-03-26 | End: 2018-06-28

## 2018-03-26 RX ORDER — DOXYCYCLINE 100 MG/1
CAPSULE ORAL
Qty: 30 CAPSULE | Refills: 0 | Status: SHIPPED | OUTPATIENT
Start: 2018-03-26 | End: 2018-05-20

## 2018-03-26 ASSESSMENT — PAIN SCALES - GENERAL: PAINLEVEL: NO PAIN (0)

## 2018-03-26 NOTE — TELEPHONE ENCOUNTER
doxycycline (VIBRAMYCIN) 100 MG capsule      Last Written Prescription Date:  02/21/17  Last Fill Quantity: 90,   # refills: 3  Last Office Visit: 02/21/17  Future Office visit:

## 2018-03-26 NOTE — PROGRESS NOTES
"  SUBJECTIVE:   Minerva Rodríguez is a 52 year old female who presents to clinic today for the following health issues:  Rash      Duration: x 5 months comes and go    Description  Location: left side of the face.  Itching: mild    Intensity:  moderate    Accompanying signs and symptoms: None    History (similar episodes/previous evaluation): yes, saw dermatologist 2 years ago.  dx rosacea.  Doxy has stopped working. Current rash is typical. Topical steroids made it worse.      Precipitating or alleviating factors:  New exposures:  None  Recent travel: no      Therapies tried and outcome: none                     Allergies   Allergen Reactions     Other [Seasonal Allergies] Rash and Blisters     Hair color        Past Medical History:   Diagnosis Date     Alcohol dependence (H)      Depression with anxiety 1/19/2016     Hypertension 2/20/2009     Major depression in remission (H) 2/20/2009         Current Outpatient Prescriptions on File Prior to Visit:  buPROPion (WELLBUTRIN XL) 150 MG 24 hr tablet TAKE 1 TABLET (150 MG) BY MOUTH EVERY MORNING   doxycycline (VIBRAMYCIN) 100 MG capsule Take 1 capsule (100 mg) by mouth daily     No current facility-administered medications on file prior to visit.     Social History   Substance Use Topics     Smoking status: Never Smoker     Smokeless tobacco: Never Used     Alcohol use 25.2 oz/week     42 Cans of beer per week       ROS:  General: negative for fever  SKIN: + as above       Physcial Exam:  /88 (BP Location: Right arm, Patient Position: Chair, Cuff Size: Adult Regular)  Pulse 89  Temp 97.7  F (36.5  C) (Oral)  Ht 5' 4.25\" (1.632 m)  Wt 125 lb (56.7 kg)  SpO2 97%  BMI 21.29 kg/m2    GENERAL: alert, no acute distress  EYES: conjunctival clear  RESP: Regular breathing rate  NEURO: awake .  SKIN: left medial cheek with 4 round red papular lesions, 8-10 mm diameter    ASSESSMENT:    ICD-10-CM    1. Rosacea L71.9 metroNIDAZOLE (METROGEL) 0.75 % topical gel     " sulfacetamide sodium, Acne, 10 % lotion       PLAN:will consider oral isotretinoin if these fail.    See today's orders.  Follow-up with primary clinic if not improving.  Advised about symptoms which might herald more serious problems.

## 2018-03-26 NOTE — PATIENT INSTRUCTIONS
At [facility], we strive to deliver an exceptional experience to you, every time we see you.  If you receive a survey in the mail, please send us back your thoughts. We really do value your feedback.    Based on your medical history, these are the current health maintenance/preventive care services that you are due for (some may have been done at this visit.)  There are no preventive care reminders to display for this patient.      Suggested websites for health information:  Www.[facility].org : Up to date and easily searchable information on multiple topics.  Www.medlineplus.gov : medication info, interactive tutorials, watch real surgeries online  Www.familydoctor.org : good info from the Academy of Family Physicians  Www.cdc.gov : public health info, travel advisories, epidemics (H1N1)  Www.aap.org : children's health info, normal development, vaccinations  Www.health.Replaced by [facility].[place].us : [place] dept of health, public health issues in [place], N1N1    Your care team:                            Family Medicine Internal Medicine   MD Robert Alonzo MD Shantel Branch-Fleming, MD Katya Georgiev PA-C Megan Hill, APRN CNP    Bruce Angel MD Pediatrics   Kobe Lyon, PAFAYE Luna, CNP Trang Graves APRN CNP   MD Candice Ponce MD Deborah Mielke, MD Kim Thein, APRN [facility]      Clinic hours: Monday - Thursday 7 am-7 pm; Fridays 7 am-5 pm.   Urgent care: Monday - Friday 11 am-9 pm; Saturday and Sunday 9 am-5 pm.  Pharmacy : Monday -Thursday 8 am-8 pm; Friday 8 am-6 pm; Saturday and Sunday 9 am-5 pm.     Clinic: [phone]   Pharmacy: [phone]        Rosacea  Rosacea is a long-lasting (chronic) skin condition affecting the face. In the early stages it causes easy flushing or blushing. Redness may become long-term (permanent) as the small blood vessels of the face widen (dilate). There may be small, red, pus-filled bumps (pustules). It looks like a bad case of acne,  and has been called adult acne. But it is not caused by the same things that cause acne.  Rosacea is a chronic illness. You will have flare-ups that come and go. This may happen every few weeks or every few months. Experts don't know what causes rosacea. If not treated, it tends to get worse over time.  Some men with a more severe form of rosacea develop a condition called rhinophyma. The oil glands on the skin of the nose become blocked and the nose gets bigger. The cheeks also become puffy. Alcohol may increase the flushing. But this condition is not caused by alcohol use.  Rosacea can be treated with topical gels and creams. Oral antibiotics are used for more severe cases. You should see improvement in the first 4 weeks. Dilated blood vessels can be treated with a small electric needle or laser surgery. Rhinophyma can be treated with surgery. Or it may be treated by surgically scraping the skin (dermabrasion).  Home care    Avoid things that make your face red or flushed. These include hot drinks, spicy foods, caffeine, and alcohol.    Exercise indoors or in a cool area to avoid getting overheated.    Avoid excess sun exposure. Use a sunscreen with at least SPF 15 or higher.    Avoid extreme hot or extreme cold weather.    Don't scrub your face. That will irritate the skin and increase redness.    Avoid harsh soaps or moisturizers with irritating ingredients. You may need to use hypoallergenic cosmetics.    Avoid over-the-counter treatments unless instructed by your healthcare provider. Some of these treatments may make rosacea worse.    Try to figure out what triggers your flares (such as stress, sun exposure, or certain foods).  Follow-up care  Follow up with your healthcare provider, or as advised. Contact your provider if your condition is not responding to the medicines you were given. Getting treatment early can stop it from getting worse.  When to seek medical advice  Call your healthcare provider right  away if you have redness, burning, or a gritty feeling in your eyes.  Date Last Reviewed: 8/1/2016 2000-2017 [employer]. [address] [place] [address], [place] [address]. All rights reserved. This information is not intended as a substitute for professional medical care. Always follow your healthcare professional's instructions.

## 2018-03-26 NOTE — MR AVS SNAPSHOT
After Visit Summary   3/26/2018    Minerva Rodríguez    MRN: [id number]           Patient Information     Date Of Birth          1965        Visit Information        Provider Department      3/26/2018 1:40 PM Arun Lyon PA [facility]        Today's Diagnoses     Rosacea    -  1      Care Instructions    At [facility], we strive to deliver an exceptional experience to you, every time we see you.  If you receive a survey in the mail, please send us back your thoughts. We really do value your feedback.    Based on your medical history, these are the current health maintenance/preventive care services that you are due for (some may have been done at this visit.)  There are no preventive care reminders to display for this patient.      Suggested websites for health information:  Www."[employer] : Up to date and easily searchable information on multiple topics.  Www.[employer].gov : medication info, interactive tutorials, watch real surgeries online  Www.familydoctor.org : good info from the Academy of Family Physicians  Www.cdc.gov : public health info, travel advisories, epidemics (H1N1)  Www.aap.org : children's health info, normal development, vaccinations  Www.health.[facility].[place].us : [place] dept of health, public health issues in [place], N1N1    Your care team:                            Family Medicine Internal Medicine   MD Robert Alonzo MD Shantel Branch-Fleming, MD Katya Georgiev PA-C Megan Hill, APRN ARI Angel MD Pediatrics   Kobe Lyon PARoxaneC  Shantelle Luna, ARI Graves APRN CNP   MD Candice Ponce MD Deborah Mielke, MD Kim Thein, APRN [facility]      Clinic hours: Monday - Thursday 7 am-7 pm; Fridays 7 am-5 pm.   Urgent care: Monday - Friday 11 am-9 pm; Saturday and Sunday 9 am-5 pm.  Pharmacy : Monday -Thursday 8 am-8 pm; Friday 8 am-6 pm; Saturday and Sunday 9 am-5 pm.     Clinic: [phone]  [phone]   Pharmacy: [phone]        Rosacea  Rosacea is a long-lasting (chronic) skin condition affecting the face. In the early stages it causes easy flushing or blushing. Redness may become long-term (permanent) as the small blood vessels of the face widen (dilate). There may be small, red, pus-filled bumps (pustules). It looks like a bad case of acne, and has been called adult acne. But it is not caused by the same things that cause acne.  Rosacea is a chronic illness. You will have flare-ups that come and go. This may happen every few weeks or every few months. Experts don't know what causes rosacea. If not treated, it tends to get worse over time.  Some men with a more severe form of rosacea develop a condition called rhinophyma. The oil glands on the skin of the nose become blocked and the nose gets bigger. The cheeks also become puffy. Alcohol may increase the flushing. But this condition is not caused by alcohol use.  Rosacea can be treated with topical gels and creams. Oral antibiotics are used for more severe cases. You should see improvement in the first 4 weeks. Dilated blood vessels can be treated with a small electric needle or laser surgery. Rhinophyma can be treated with surgery. Or it may be treated by surgically scraping the skin (dermabrasion).  Home care    Avoid things that make your face red or flushed. These include hot drinks, spicy foods, caffeine, and alcohol.    Exercise indoors or in a cool area to avoid getting overheated.    Avoid excess sun exposure. Use a sunscreen with at least SPF 15 or higher.    Avoid extreme hot or extreme cold weather.    Don't scrub your face. That will irritate the skin and increase redness.    Avoid harsh soaps or moisturizers with irritating ingredients. You may need to use hypoallergenic cosmetics.    Avoid over-the-counter treatments unless instructed by your healthcare provider. Some of these treatments may make rosacea worse.    Try to figure out  "what triggers your flares (such as stress, sun exposure, or certain foods).  Follow-up care  Follow up with your healthcare provider, or as advised. Contact your provider if your condition is not responding to the medicines you were given. Getting treatment early can stop it from getting worse.  When to seek medical advice  Call your healthcare provider right away if you have redness, burning, or a gritty feeling in your eyes.  Date Last Reviewed: 8/1/2016 2000-2017 [employer]. [address] [place] [address]. All rights reserved. This information is not intended as a substitute for professional medical care. Always follow your healthcare professional's instructions.                Follow-ups after your visit        Follow-up notes from your care team     Return if symptoms worsen or fail to improve.      Who to contact     If you have questions or need follow up information about today's clinic visit or your schedule please contact [facility] directly at [phone].  Normal or non-critical lab and imaging results will be communicated to you by mPATHhart, letter or phone within 4 business days after the clinic has received the results. If you do not hear from us within 7 days, please contact the clinic through mPATHhart or phone. If you have a critical or abnormal lab result, we will notify you by phone as soon as possible.  Submit refill requests through [employer] or call your pharmacy and they will forward the refill request to us. Please allow 3 business days for your refill to be completed.          Additional Information About Your Visit        mPATHhart Information     [employer] lets you send messages to your doctor, view your test results, renew your prescriptions, schedule appointments and more. To sign up, go to [email or web address] . Click on \"Log in\" on the left side of the screen, which will take you to the Welcome page. Then click on \"Sign up Now\" on the right " "side of the page.     You will be asked to enter the access code listed below, as well as some personal information. Please follow the directions to create your username and password.     Your access code is: [email or web address]  Expires: 2018  2:05 PM     Your access code will  in 90 days. If you need help or a new code, please call your [facility] or [phone].        Care EveryWhere ID     This is your Care EveryWhere ID. This could be used by other organizations to access your [place] medical records  [id number]        Your Vitals Were     Pulse Temperature Height Pulse Oximetry BMI (Body Mass Index)       89 97.7  F (36.5  C) (Oral) 5' 4.25\" (1.632 m) 97% 21.29 kg/m2        Blood Pressure from Last 3 Encounters:   18 150/88   17 101/70   17 130/70    Weight from Last 3 Encounters:   18 125 lb (56.7 kg)   17 129 lb 1.6 oz (58.6 kg)   16 129 lb 14.4 oz (58.9 kg)              Today, you had the following     No orders found for display         Today's Medication Changes          These changes are accurate as of 3/26/18  2:05 PM.  If you have any questions, ask your nurse or doctor.               Start taking these medicines.        Dose/Directions    metroNIDAZOLE 0.75 % topical gel   Commonly known as:  METROGEL   Used for:  Rosacea        Apply topically 2 times daily   Quantity:  60 g   Refills:  3       sulfacetamide sodium (Acne) 10 % lotion   Used for:  Rosacea        Apply topically 2 times daily Wash off after 5 minutes   Quantity:  1 Bottle   Refills:  3            Where to get your medicines      These medications were sent to [place] [address] [place] [facility] [address] [facility]  [address] [facility] [address]     Phone:  [phone]     metroNIDAZOLE 0.75 % topical gel    sulfacetamide sodium (Acne) 10 % lotion                Primary Care Provider Office Phone # Fax #    Rogelio Angel MD PhD [phone]  [facility] [address]        Equal Access to Services     SAMMI CLARK : Hadii aad ku hadlorenzaaly Jesusali, wacynthiada larry, qatariqta leighfelisajazmine mckeon, adelaide brockyovanaeverett gerber . So [facility] [phone].    ATENCIÓN: Si habla español, tiene a shah disposición servicios gratuitos de asistencia lingüística. Llame al [phone].    We comply with applicable federal civil rights laws and [place] laws. We do not discriminate on the basis of race, color, national origin, age, disability, sex, sexual orientation, or gender identity.            Thank you!     Thank you for choosing [facility]  for your care. Our goal is always to provide you with excellent care. Hearing back from our patients is one way we can continue to improve our services. Please take a few minutes to complete the written survey that you may receive in the mail after your visit with us. Thank you!             Your Updated Medication List - Protect others around you: Learn how to safely use, store and throw away your medicines at www.disposemymeds.org.          This list is accurate as of 3/26/18  2:05 PM.  Always use your most recent med list.                   Brand Name Dispense Instructions for use Diagnosis    buPROPion 150 MG 24 hr tablet    WELLBUTRIN XL    30 tablet    TAKE 1 TABLET (150 MG) BY MOUTH EVERY MORNING    Depression with anxiety       doxycycline 100 MG capsule    VIBRAMYCIN    90 capsule    Take 1 capsule (100 mg) by mouth daily    Rosacea       metroNIDAZOLE 0.75 % topical gel    METROGEL    60 g    Apply topically 2 times daily    Rosacea       sulfacetamide sodium (Acne) 10 % lotion     1 Bottle    Apply topically 2 times daily Wash off after 5 minutes    Rosacea

## 2018-04-20 DIAGNOSIS — F41.8 DEPRESSION WITH ANXIETY: ICD-10-CM

## 2018-04-20 NOTE — TELEPHONE ENCOUNTER
Patient was provided a 30 day josé refill and a letter was sent notifying the patient to schedule annual physical. Patient has not yet followed up. Will attempt to reach patient to schedule prior to authorizing 2nd josé refill.     buPROPion (WELLBUTRIN XL) 150 MG 24 hr tablet 30 tablet 0 1/10/2018  No   Sig: TAKE 1 TABLET (150 MG) BY MOUTH EVERY MORNING   Class: E-Prescribe   Notes to Pharmacy: Due for follow up. Letter sent to patient   Depression with anxiety [F41.8]     Last OV with Dr. Angel: 10/29/2014 and 2/21/2017 with Zoraida Leal CNP    No future apts.     PHQ-9 SCORE 1/19/2016 2/21/2017 6/15/2017   Total Score 14 12 0     BHARAT-7 SCORE 1/19/2016 2/21/2017 6/20/2017   Total Score 8 11 2     SSRIs Protocol Failed4/20 1:17 AM   Recent (12 mo) or future (30 days) visit within the authorizing provider's specialty    Medication is Bupropion    Patient is age 18 or older    No active pregnancy on record    No positive pregnancy test in last 12 months     Nina Brewer, RN

## 2018-05-03 NOTE — TELEPHONE ENCOUNTER
2nd attempt:    Wireless customer not available.  Unable to leave message.    Darcie Patrick RN,   [facility]

## 2018-05-07 RX ORDER — BUPROPION HYDROCHLORIDE 150 MG/1
TABLET ORAL
Qty: 30 TABLET | Refills: 0 | OUTPATIENT
Start: 2018-05-07

## 2018-05-07 NOTE — TELEPHONE ENCOUNTER
3rd attempt    Patient has not yet scheduled. Refill refused. Pharmacy notified that patient needs an appointment for further refills. Nina Brewer RN

## 2018-05-20 DIAGNOSIS — L71.9 ROSACEA: ICD-10-CM

## 2018-05-21 NOTE — TELEPHONE ENCOUNTER
Attempt 1    Patient has not yet followed up for annual physical.     Unable to LM - busy tone x2    doxycycline (VIBRAMYCIN) 100 MG capsule 30 capsule 0 3/26/2018  No   Sig: TAKE 1 CAPSULE BY MOUTH DAILY   Class: E-Prescribe   Notes to Pharmacy: Patient due for annual OV. Gemma refill. Patient notified by letter.     Last OV with Dr. Angel: 2/21/2017 with Zoraida Leal CNP    No future apts.     Nina Brewer RN

## 2018-05-23 RX ORDER — DOXYCYCLINE 100 MG/1
CAPSULE ORAL
Qty: 30 CAPSULE | Refills: 0 | Status: SHIPPED | OUTPATIENT
Start: 2018-05-23 | End: 2018-06-28

## 2018-05-23 NOTE — TELEPHONE ENCOUNTER
Patient followed with clinic and scheduled annual physical and labs    Next 5 appointments (look out 90 days)     Jun 12, 2018  8:30 AM CDT   [facility] ANGI Simon CNP   [facility]    [address] [place] [id number]   [phone]                [facility] josé refill. Nina Brewer RN

## 2018-06-08 ENCOUNTER — DOCUMENTATION ONLY (OUTPATIENT)
Dept: LAB | Facility: CLINIC | Age: 53
End: 2018-06-08

## 2018-06-08 DIAGNOSIS — Z00.00 ENCOUNTER FOR ROUTINE ADULT HEALTH EXAMINATION WITHOUT ABNORMAL FINDINGS: Primary | ICD-10-CM

## 2018-06-08 NOTE — PROGRESS NOTES
Future labs orders pended for provider to please review.     Mariluz Manley RN, [facility]

## 2018-06-28 ENCOUNTER — OFFICE VISIT (OUTPATIENT)
Dept: PEDIATRICS | Facility: CLINIC | Age: 53
End: 2018-06-28
Payer: COMMERCIAL

## 2018-06-28 ENCOUNTER — OFFICE VISIT (OUTPATIENT)
Dept: PSYCHOLOGY | Facility: CLINIC | Age: 53
End: 2018-06-28

## 2018-06-28 VITALS
TEMPERATURE: 98.2 F | SYSTOLIC BLOOD PRESSURE: 109 MMHG | BODY MASS INDEX: 21.1 KG/M2 | WEIGHT: 123.9 LBS | OXYGEN SATURATION: 99 % | DIASTOLIC BLOOD PRESSURE: 70 MMHG | HEART RATE: 80 BPM

## 2018-06-28 DIAGNOSIS — Z00.00 ENCOUNTER FOR ROUTINE ADULT HEALTH EXAMINATION WITHOUT ABNORMAL FINDINGS: Primary | ICD-10-CM

## 2018-06-28 DIAGNOSIS — Z13.29 SCREENING FOR THYROID DISORDER: ICD-10-CM

## 2018-06-28 DIAGNOSIS — L71.9 ROSACEA: ICD-10-CM

## 2018-06-28 DIAGNOSIS — F41.8 DEPRESSION WITH ANXIETY: ICD-10-CM

## 2018-06-28 DIAGNOSIS — Z13.0 SCREENING FOR DISORDER OF BLOOD AND BLOOD-FORMING ORGANS: ICD-10-CM

## 2018-06-28 DIAGNOSIS — Z12.31 ENCOUNTER FOR SCREENING MAMMOGRAM FOR BREAST CANCER: ICD-10-CM

## 2018-06-28 DIAGNOSIS — Z13.6 CARDIOVASCULAR SCREENING; LDL GOAL LESS THAN 130: ICD-10-CM

## 2018-06-28 DIAGNOSIS — F41.8 DEPRESSION WITH ANXIETY: Primary | ICD-10-CM

## 2018-06-28 DIAGNOSIS — F10.29 ALCOHOL DEPENDENCE WITH UNSPECIFIED ALCOHOL-INDUCED DISORDER (H): ICD-10-CM

## 2018-06-28 DIAGNOSIS — Z13.1 SCREENING FOR DIABETES MELLITUS: ICD-10-CM

## 2018-06-28 PROCEDURE — 99396 PREV VISIT EST AGE 40-64: CPT | Performed by: NURSE PRACTITIONER

## 2018-06-28 PROCEDURE — 99213 OFFICE O/P EST LOW 20 MIN: CPT | Mod: 25 | Performed by: NURSE PRACTITIONER

## 2018-06-28 PROCEDURE — 99207 ZZC NO CHARGE BEHAVIORAL WARM HANDOFF: CPT | Performed by: SOCIAL WORKER

## 2018-06-28 RX ORDER — METRONIDAZOLE 7.5 MG/G
GEL TOPICAL 2 TIMES DAILY
Qty: 60 G | Refills: 3 | Status: SHIPPED | OUTPATIENT
Start: 2018-06-28 | End: 2020-04-12

## 2018-06-28 RX ORDER — BUPROPION HYDROCHLORIDE 150 MG/1
150 TABLET ORAL EVERY MORNING
Qty: 60 TABLET | Refills: 1 | Status: SHIPPED | OUTPATIENT
Start: 2018-06-28 | End: 2018-08-09

## 2018-06-28 RX ORDER — DOXYCYCLINE 100 MG/1
CAPSULE ORAL
Qty: 90 CAPSULE | Refills: 1 | Status: SHIPPED | OUTPATIENT
Start: 2018-06-28 | End: 2019-04-29

## 2018-06-28 RX ORDER — BUPROPION HYDROCHLORIDE 150 MG/1
TABLET ORAL
Qty: 30 TABLET | Refills: 0 | Status: CANCELLED | OUTPATIENT
Start: 2018-06-28

## 2018-06-28 ASSESSMENT — ANXIETY QUESTIONNAIRES
2. NOT BEING ABLE TO STOP OR CONTROL WORRYING: SEVERAL DAYS
6. BECOMING EASILY ANNOYED OR IRRITABLE: SEVERAL DAYS
7. FEELING AFRAID AS IF SOMETHING AWFUL MIGHT HAPPEN: MORE THAN HALF THE DAYS
5. BEING SO RESTLESS THAT IT IS HARD TO SIT STILL: NOT AT ALL
3. WORRYING TOO MUCH ABOUT DIFFERENT THINGS: SEVERAL DAYS
GAD7 TOTAL SCORE: 7
1. FEELING NERVOUS, ANXIOUS, OR ON EDGE: SEVERAL DAYS

## 2018-06-28 ASSESSMENT — PATIENT HEALTH QUESTIONNAIRE - PHQ9: 5. POOR APPETITE OR OVEREATING: SEVERAL DAYS

## 2018-06-28 NOTE — NURSING NOTE
"Chief Complaint   Patient presents with     Physical     AFE-not fasting       Initial /70 (BP Location: Right arm, Patient Position: Sitting, Cuff Size: Adult Regular)  Pulse 80  Temp 98.2  F (36.8  C) (Temporal)  Wt 123 lb 14.4 oz (56.2 kg)  SpO2 99%  Breastfeeding? No  BMI 21.1 kg/m2 Estimated body mass index is 21.1 kg/(m^2) as calculated from the following:    Height as of 3/26/18: 5' 4.25\" (1.632 m).    Weight as of this encounter: 123 lb 14.4 oz (56.2 kg).  Medication Reconciliation: complete      ANASTASIA Grant      "

## 2018-06-28 NOTE — PROGRESS NOTES
SUBJECTIVE:   CC: Minerva Rodríguez is an 53 year old woman who presents for preventive health visit.     Healthy Habits:    Do you get at least three servings of calcium containing foods daily (dairy, green leafy vegetables, etc.)? yes    Amount of exercise or daily activities, outside of work: 4-5 day(s) per week    Problems taking medications regularly not applicable    Medication side effects: No    Have you had an eye exam in the past two years? no    Do you see a dentist twice per year? no    Do you have sleep apnea, excessive snoring or daytime drowsiness?no      Depression and Anxiety Follow-Up    Status since last visit: Worsened has been out of medication for the last 3 months, would like to discuss a new medication     Other associated symptoms:None    Complicating factors:     Significant life event: No     Current substance abuse: Alcohol    PHQ-9 1/19/2016 2/21/2017 6/15/2017   Total Score 14 12 0   Q9: Suicide Ideation Not at all Not at all Not at all     BHARAT-7 SCORE 1/19/2016 2/21/2017 6/20/2017   Total Score 8 11 2   was on wellbutrin but then felt like was not working as well and then ran out for the last 3 months  Alcohol intake has increased in the last 3 months  Never seen counseling in adult but did as a teenager   Has had issues with alcohol since mid 30s. Never gets wasted she says but admits       In the past two weeks have you had thoughts of suicide or self-harm?  No.    Do you have concerns about your personal safety or the safety of others?   No  PHQ-9  English  PHQ-9   Any Language  BHARAT-7  Suicide Assessment Five-step Evaluation and Treatment (SAFE-T)    Today's PHQ-2 Score:   PHQ-2 ( 1999 [employer]) 6/28/2018 2/21/2017   Q1: Little interest or pleasure in doing things 3 3   Q2: Feeling down, depressed or hopeless 3 3   PHQ-2 Score 6 6       Abuse: Current or Past(Physical, Sexual or Emotional)- No  Do you feel safe in your environment - Yes    Social History   Substance Use Topics      Smoking status: Never Smoker     Smokeless tobacco: Never Used     Alcohol use 25.2 oz/week     42 Cans of beer per week     If you drink alcohol do you typically have >3 drinks per day or >7 drinks per week? Yes - AUDIT SCORE:     No flowsheet data found.                     Reviewed orders with patient.  Reviewed health maintenance and updated orders accordingly - Yes  Labs reviewed in EPIC  BP Readings from Last 3 Encounters:   06/28/18 109/70   03/26/18 150/88   04/14/17 101/70    Wt Readings from Last 3 Encounters:   06/28/18 123 lb 14.4 oz (56.2 kg)   03/26/18 125 lb (56.7 kg)   02/21/17 129 lb 1.6 oz (58.6 kg)                  Patient Active Problem List   Diagnosis     Depression with anxiety     Alcohol dependence (H)     Hypertension     Major depression in remission (H)     Cervical cancer screening     Rosacea     Past Surgical History:   Procedure Laterality Date     COLONOSCOPY WITH CO2 INSUFFLATION N/A 4/14/2017    Procedure: COLONOSCOPY WITH CO2 INSUFFLATION;  Surgeon: Duane, William Charles, MD;  Location: [facility] OR     NO HISTORY OF SURGERY         Social History   Substance Use Topics     Smoking status: Never Smoker     Smokeless tobacco: Never Used     Alcohol use 25.2 oz/week     42 Cans of beer per week     Family History   Problem Relation Age of Onset     Hypertension Father      Depression Father      Substance Abuse Father      Depression Brother      Cancer Maternal Aunt      Other Cancer Maternal Aunt      Diabetes No family hx of      Coronary Artery Disease No family hx of      Hyperlipidemia No family hx of      Cerebrovascular Disease No family hx of      Breast Cancer No family hx of      Colon Cancer No family hx of      Asthma No family hx of      Thyroid Disease No family hx of          Current Outpatient Prescriptions   Medication Sig Dispense Refill     buPROPion (WELLBUTRIN XL) 150 MG 24 hr tablet TAKE 1 TABLET (150 MG) BY MOUTH EVERY MORNING 30 tablet 0     doxycycline  (VIBRAMYCIN) 100 MG capsule TAKE 1 CAPSULE BY MOUTH DAILY (Patient not taking: Reported on 6/28/2018) 30 capsule 0     Allergies   Allergen Reactions     Other [Seasonal Allergies] Rash and Blisters     Hair color        Patient over age 50, mutual decision to screen reflected in health maintenance.    Pertinent mammograms are reviewed under the imaging tab.  History of abnormal Pap smear: NO - age 30- 65 PAP every 3 years recommended  PAP / HPV Latest Ref Rng & Units 2/21/2017   PAP - NIL   HPV 16 DNA NEG Negative   HPV 18 DNA NEG Negative   OTHER HR HPV NEG Negative     Reviewed and updated as needed this visit by clinical staff  Tobacco  Allergies  Meds  Med Hx  Surg Hx  Fam Hx  Soc Hx        Reviewed and updated as needed this visit by Provider        Past Medical History:   Diagnosis Date     Alcohol dependence (H)      Depression with anxiety 1/19/2016     Hypertension 2/20/2009     Major depression in remission (H) 2/20/2009      Past Surgical History:   Procedure Laterality Date     COLONOSCOPY WITH CO2 INSUFFLATION N/A 4/14/2017    Procedure: COLONOSCOPY WITH CO2 INSUFFLATION;  Surgeon: Duane, William Charles, MD;  Location:  OR     NO HISTORY OF SURGERY         ROS:  CONSTITUTIONAL:NEGATIVE for fever, chills, change in weight  INTEGUMENTARY/SKIN: NEGATIVE for non-healing lesion  and POSITIVE for rash face doing better on the doxycycline but when off really flares up   EYES: NEGATIVE for vision changes or irritation  ENT: NEGATIVE for ear, mouth and throat problems  RESP:NEGATIVE for significant cough or SOB  BREAST: NEGATIVE for masses, tenderness or discharge  CV: NEGATIVE for chest pain, palpitations or peripheral edema  GI: NEGATIVE for nausea, abdominal pain, heartburn, or change in bowel habits   menopausal female: amenorrhea, no unusual urinary symptoms and no unusual vaginal symptoms  MUSCULOSKELETAL:NEGATIVE for significant arthralgias or myalgia  NEURO: NEGATIVE for weakness, dizziness  or paresthesias  ENDOCRINE: NEGATIVE for temperature intolerance, skin/hair changes  HEME/ALLERGY/IMMUNE: NEGATIVE for bleeding problems  PSYCHIATRIC: POSITIVE foralcohol abuse, Hx anxiety, Hx depression and suicidal thoughts with out a plan and NEGATIVE forsuicidal thoughts with specific plan and thoughts of hurting someone else     PHQ-9 SCORE 1/19/2016 2/21/2017 6/15/2017   Total Score 14 12 0       BHARAT-7 SCORE 1/19/2016 2/21/2017 6/20/2017   Total Score 8 11 2       OBJECTIVE:   /70 (BP Location: Right arm, Patient Position: Sitting, Cuff Size: Adult Regular)  Pulse 80  Temp 98.2  F (36.8  C) (Temporal)  Wt 123 lb 14.4 oz (56.2 kg)  SpO2 99%  Breastfeeding? No  BMI 21.1 kg/m2   Wt Readings from Last 4 Encounters:   06/28/18 123 lb 14.4 oz (56.2 kg)   03/26/18 125 lb (56.7 kg)   02/21/17 129 lb 1.6 oz (58.6 kg)   07/01/16 129 lb 14.4 oz (58.9 kg)       EXAM:  GENERAL APPEARANCE: healthy, alert and no distress  EYES: Eyes grossly normal to inspection, PERRL and conjunctivae and sclerae normal  HENT: ear canals and TM's normal, nose and mouth without ulcers or lesions, oropharynx clear and oral mucous membranes moist  NECK: no adenopathy, no asymmetry, masses, or scars and thyroid normal to palpation  RESP: lungs clear to auscultation - no rales, rhonchi or wheezes  BREAST: normal without masses, tenderness or nipple discharge and no palpable axillary masses or adenopathy  CV: regular rates and rhythm, no murmur, click or rub, no peripheral edema and peripheral pulses strong  ABDOMEN: soft, nontender, no hepatosplenomegaly, no masses and bowel sounds normal   (female): normal female external genitalia, normal urethral meatus, vaginal mucosal atrophy noted, normal cervix, adnexae, and uterus without masses or abnormal discharge  MS: no musculoskeletal defects are noted and gait is age appropriate without ataxia  SKIN: no suspicious lesions or rashes  NEURO: Normal strength and tone, sensory exam  grossly normal, mentation intact and speech normal  PSYCH: mentation appears normal and affect normal/bright    Diagnostic Test Results:  Pending orders and results       ASSESSMENT/PLAN:   Minerva was seen today for physical.    Diagnoses and all orders for this visit:    Encounter for routine adult health examination without abnormal findings  -     JUST IN CASE  -     CBC with platelets  -     Comprehensive metabolic panel  -     Lipid panel reflex to direct LDL Fasting  -     TSH with free T4 reflex  -     INR    Depression with anxiety  -     buPROPion (WELLBUTRIN XL) 150 MG 24 hr tablet; Take 1 tablet (150 mg) by mouth every morning One a day for 3 days then 2 a day  Initiate medication with Wellbutrin XL   Reviewed concept of depression as function of biochemical imbalance of neurotransmitters/rationale for treatment.  Risks and benefits of medication(s) reviewed with patient.  Questions answered.  Counseling advised  Followup appointment in 1 month(s)  Patient instructed to call for significant side effects medications or problems  Patient advised immediate presentation to hospital for suicidal thought, etc.  Initiated consultation with Shantel Gipson [facility], Behavioral Health Clinician for mental health counseling.     Screening for diabetes mellitus  -     Comprehensive metabolic panel    CARDIOVASCULAR SCREENING; LDL GOAL LESS THAN 130  -     Lipid panel reflex to direct LDL Fasting    Screening for thyroid disorder  -     TSH with free T4 reflex    Screening for disorder of blood and blood-forming organs    Encounter for screening mammogram for breast cancer  -     MA Screening Digital Bilateral; Future    Alcohol dependence with unspecified alcohol-induced disorder (H)  -     CBC with platelets  -     Comprehensive metabolic panel  -     TSH with free T4 reflex    Rosacea  -     doxycycline (VIBRAMYCIN) 100 MG capsule; TAKE 1 CAPSULE BY MOUTH DAILY  -     metroNIDAZOLE (METROGEL) 0.75 % topical gel;  Apply topically 2 times daily  -     DERMATOLOGY REFERRAL       PLAN:   1.   Symptomatic therapy suggested: restart wellbutrin once a day   Increase calcium to 1000mg and 800iu Vit D  2.  Orders Placed This Encounter   Medications     doxycycline (VIBRAMYCIN) 100 MG capsule     Sig: TAKE 1 CAPSULE BY MOUTH DAILY     Dispense:  90 capsule     Refill:  1     metroNIDAZOLE (METROGEL) 0.75 % topical gel     Sig: Apply topically 2 times daily     Dispense:  60 g     Refill:  3     buPROPion (WELLBUTRIN XL) 150 MG 24 hr tablet     Sig: Take 1 tablet (150 mg) by mouth every morning One a day for 3 days then 2 a day     Dispense:  60 tablet     Refill:  1     Orders Placed This Encounter   Procedures     MA Screening Digital Bilateral     JUST IN CASE     CBC with platelets     Comprehensive metabolic panel     Lipid panel reflex to direct LDL Fasting     TSH with free T4 reflex     INR     DERMATOLOGY REFERRAL       3. Patient needs to follow up in if no improvement,or sooner if worsening of symptoms or other symptoms develop.  FURTHER TESTING:       - mammogram  CONSULTATION/REFERRAL to Dermatology  Initiated consultation with [facility]so[facility], Behavioral Health Clinician for mental health counseling.   Will follow up and/or notify patient on results via phone or mail to determine further need for followup  Follow up in 1 month.  Follow up office visit in one year for annual health maintenance exam, sooner PRN.      COUNSELING:   Reviewed preventive health counseling, as reflected in patient instructions  Special attention given to:        Regular exercise       Healthy diet/nutrition       Vision screening       Osteoporosis Prevention/Bone Health       Colon cancer screening       Consider Hep C screening for patients born between 1945 and 1965       The ASCVD Risk score ([place] BARNEY Jr, et al., 2013) failed to calculate for the following reasons:    The valid HDL cholesterol range is 20 to 100 mg/dL    BP Readings from  "Last 1 Encounters:   03/26/18 150/88     Estimated body mass index is 21.29 kg/(m^2) as calculated from the following:    Height as of 3/26/18: 5' 4.25\" (1.632 m).    Weight as of 3/26/18: 125 lb (56.7 kg).       reports that she has never smoked. She has never used smokeless tobacco.      Counseling Resources:  ATP IV Guidelines  Pooled Cohorts Equation Calculator  Breast Cancer Risk Calculator  FRAX Risk Assessment  ICSI Preventive Guidelines  Dietary Guidelines for Americans, 2010  USDA's MyPlate  ASA Prophylaxis  Lung CA Screening    ANGI Simon CNP  [facility]  "

## 2018-06-28 NOTE — MR AVS SNAPSHOT
After Visit Summary   6/28/2018    Minerva Rodríguez    MRN: [id number]           Patient Information     Date Of Birth          1965        Visit Information        Provider Department      6/28/2018 8:50 AM Zoraida Leal APRN CNP [facility]        Today's Diagnoses     Encounter for routine adult health examination without abnormal findings    -  1    Depression with anxiety        Screening for diabetes mellitus        CARDIOVASCULAR SCREENING; LDL GOAL LESS THAN 130        Screening for thyroid disorder        Screening for disorder of blood and blood-forming organs        Encounter for screening mammogram for breast cancer        Alcohol dependence with unspecified alcohol-induced disorder (H)        Rosacea          Care Instructions    PLAN:   1.   Symptomatic therapy suggested: restart wellbutrin once a day   Increase calcium to 1000mg and 800iu Vit D  2.  Orders Placed This Encounter   Medications     doxycycline (VIBRAMYCIN) 100 MG capsule     Sig: TAKE 1 CAPSULE BY MOUTH DAILY     Dispense:  90 capsule     Refill:  1     metroNIDAZOLE (METROGEL) 0.75 % topical gel     Sig: Apply topically 2 times daily     Dispense:  60 g     Refill:  3     buPROPion (WELLBUTRIN XL) 150 MG 24 hr tablet     Sig: Take 1 tablet (150 mg) by mouth every morning One a day for 3 days then 2 a day     Dispense:  60 tablet     Refill:  1     Orders Placed This Encounter   Procedures     MA Screening Digital Bilateral     JUST IN CASE     CBC with platelets     Comprehensive metabolic panel     Lipid panel reflex to direct LDL Fasting     TSH with free T4 reflex     INR     DERMATOLOGY REFERRAL       3. Patient needs to follow up in if no improvement,or sooner if worsening of symptoms or other symptoms develop.  FURTHER TESTING:       - mammogram  CONSULTATION/REFERRAL to Dermatology  Initiated consultation with BAR Bledsoe, Behavioral Health Clinician for mental health counseling.    Will follow up and/or notify patient on results via phone or mail to determine further need for followup  Follow up in 1 month.  Follow up office visit in one year for annual health maintenance exam, sooner PRN.    Preventive Health Recommendations  Female Ages 50 - 64    Yearly exam: See your health care provider every year in order to  o Review health changes.   o Discuss preventive care.    o Review your medicines if your doctor has prescribed any.      Get a Pap test every three years (unless you have an abnormal result and your provider advises testing more often).    If you get Pap tests with HPV test, you only need to test every 5 years, unless you have an abnormal result.     You do not need a Pap test if your uterus was removed (hysterectomy) and you have not had cancer.    You should be tested each year for STDs (sexually transmitted diseases) if you're at risk.     Have a mammogram every 1 to 2 years.    Have a colonoscopy at age 50, or have a yearly FIT test (stool test). These exams screen for colon cancer.      Have a cholesterol test every 5 years, or more often if advised.    Have a diabetes test (fasting glucose) every three years. If you are at risk for diabetes, you should have this test more often.     If you are at risk for osteoporosis (brittle bone disease), think about having a bone density scan (DEXA).    Shots: Get a flu shot each year. Get a tetanus shot every 10 years.    Nutrition:     Eat at least 5 servings of fruits and vegetables each day.    Eat whole-grain bread, whole-wheat pasta and brown rice instead of white grains and rice.    Get adequate Calcium and Vitamin D.     Lifestyle    Exercise at least 150 minutes a week (30 minutes a day, 5 days a week). This will help you control your weight and prevent disease.    Limit alcohol to one drink per day.    No smoking.     Wear sunscreen to prevent skin cancer.     See your dentist every six months for an exam and cleaning.    See  your eye doctor every 1 to 2 years.  It was a pleasure seeing you today at the [facility]. Thank you for allowing us to care for you today. We truly hope we provided you with the excellent service you deserve. Please let us know if there is anything else we can do for you so we can be sure you are leaving completley satisfied with your care experience.       General information about your clinic   Clinic Hours Lab Hours (Appointments are required)   Mon-Thurs: 7:30 AM - 7 PM Mon-Thurs: 7:30 AM - 7 PM   Fri: 7:30 AM - 5 PM Fri: 7:30 AM - 5 PM        After Hours Nurse Advise & Appts:  [facility]: [phone]  Marie On Call: to make appointments anytime: [phone] On Call Physician: call [phone] and answering service will page the on call physician.        For urgent appointments, please call [phone] and ask for the triage nurse or your care team clinic nurse.  How to contact my care team:  [facility]: [email or web address]   Phone: [phone]   Fax: [phone]       [facility]:   Phone: [phone]  Hours: 8:00 AM - 6:00 PM  Medication Refills:  Call your pharmacy and they will forward the refill to us. Please allow 3 business days for your refills to be completed.       Normal or non-critical lab and imaging results will be communicated to you by MyChart, letter or phone within 7 days.  If you do not hear from us within 10 days, please call the clinic. If you have a critical or abnormal lab result, we will notify you by phone as soon as possible.       We now have PWIC (Pediatric Walk in Care)  Monday-Friday from 7:30-4. Simply walk in and be seen for your urgent needs like cough, fever, rash, diarrhea or vomiting, pink eye, UTI. No appointments needed. Ask one of the team for more information      -Your Care Team:    Dr. Rogelio Angel - Internal Medicine/Pediatrics   Dr. Cruz Olivas - Family Medicine  Dr. Carlee San - Pediatrics  Dr. Ambriz  Cedric - Pediatrics  Zoraida Leal CNP - Family Practice Nurse Practitioner                           Follow-ups after your visit        Additional Services     DERMATOLOGY REFERRAL       Your provider has referred you to: [facility]: [facility] [phone]   [email or web address]    Please be aware that coverage of these services is subject to the terms and limitations of your health insurance plan.  Call member services at your health plan with any benefit or coverage questions.      Please bring the following with you to your appointment:    (1) Any X-Rays, CTs or MRIs which have been performed.  Contact the facility where they were done to arrange for  prior to your scheduled appointment.    (2) List of current medications  (3) This referral request   (4) Any documents/labs given to you for this referral                  Future tests that were ordered for you today     Open Future Orders        Priority Expected Expires Ordered    MA Screening Digital Bilateral Routine  6/28/2019 6/28/2018            Who to contact     If you have questions or need follow up information about today's clinic visit or your schedule please contact [facility] directly at [phone].  Normal or non-critical lab and imaging results will be communicated to you by MyChart, letter or phone within 4 business days after the clinic has received the results. If you do not hear from us within 7 days, please contact the clinic through MyChart or phone. If you have a critical or abnormal lab result, we will notify you by phone as soon as possible.  Submit refill requests through [employer] or call your pharmacy and they will forward the refill request to us. Please allow 3 business days for your refill to be completed.          Additional Information About Your Visit        Melon[employer]QUISO Information     Mimeo is an electronic gateway that provides  easy, online access to your medical records. With [employer]aya, you can request a clinic appointment, read your test results, renew a prescription or communicate with your care team.     To sign up for Zingaya visit the website at [email or web address]   You will be asked to enter the access code listed below, as well as some personal information. Please follow the directions to create your username and password.     Your access code is: [email or web address]  Expires: 2018  9:43 AM     Your access code will  in 90 days. If you need help or a new code, please contact your [facility] or call [phone] for assistance.        Care EveryWhere ID     This is your Care EveryWhere ID. This could be used by other organizations to access your [facility] medical records  [id number]        Your Vitals Were     Pulse Temperature Pulse Oximetry Breastfeeding? BMI (Body Mass Index)       80 98.2  F (36.8  C) (Temporal) 99% No 21.1 kg/m2        Blood Pressure from Last 3 Encounters:   18 109/70   18 150/88   17 101/70    Weight from Last 3 Encounters:   18 123 lb 14.4 oz (56.2 kg)   18 125 lb (56.7 kg)   17 129 lb 1.6 oz (58.6 kg)              We Performed the Following     CBC with platelets     Comprehensive metabolic panel     DERMATOLOGY REFERRAL     INR     JUST IN CASE     Lipid panel reflex to direct LDL Fasting     TSH with free T4 reflex          Today's Medication Changes          These changes are accurate as of 18  9:43 AM.  If you have any questions, ask your nurse or doctor.               Start taking these medicines.        Dose/Directions    metroNIDAZOLE 0.75 % topical gel   Commonly known as:  METROGEL   Used for:  Rosacea   Started by:  Zoraida Leal APRN CNP        Apply topically 2 times daily   Quantity:  60 g   Refills:  3         These medicines have changed or have updated prescriptions.        Dose/Directions    *  buPROPion 150 MG 24 hr tablet   Commonly known as:  WELLBUTRIN XL   This may have changed:  Another medication with the same name was added. Make sure you understand how and when to take each.   Used for:  Depression with anxiety   Changed by:  Zoraida Leal APRN CNP        TAKE 1 TABLET (150 MG) BY MOUTH EVERY MORNING   Quantity:  30 tablet   Refills:  0       * buPROPion 150 MG 24 hr tablet   Commonly known as:  WELLBUTRIN XL   This may have changed:  You were already taking a medication with the same name, and this prescription was added. Make sure you understand how and when to take each.   Used for:  Depression with anxiety   Changed by:  Zoraida Leal APRN CNP        Dose:  150 mg   Take 1 tablet (150 mg) by mouth every morning One a day for 3 days then 2 a day   Quantity:  60 tablet   Refills:  1       * Notice:  This list has 2 medication(s) that are the same as other medications prescribed for you. Read the directions carefully, and ask your doctor or other care provider to review them with you.         Where to get your medicines      These medications were sent to [place] [address] [place] [facility] [id number] [place] [address]     Phone:  [phone]     buPROPion 150 MG 24 hr tablet    doxycycline 100 MG capsule    metroNIDAZOLE 0.75 % topical gel                Primary Care Provider Office Phone # Fax #    Rogelio Angel MD PhD [phone]       [address]  [facility] [address]        Equal Access to Services     [facility] Hospital[facility] AH: Hadii eliud oden hadasho Soosielali, waaxda luqadaha, qaybta kaalmada adeegyada, adelaide bermeo. So [facility] [phone].    ATENCIÓN: Si habla español, tiene a shah disposición servicios gratuitos de asistencia lingüística. Yifan al [phone].    We comply with applicable federal civil rights laws and [place] laws. We do not discriminate on the basis of race, color, national origin,  age, disability, sex, sexual orientation, or gender identity.            Thank you!     Thank you for choosing [facility]  for your care. Our goal is always to provide you with excellent care. Hearing back from our patients is one way we can continue to improve our services. Please take a few minutes to complete the written survey that you may receive in the mail after your visit with us. Thank you!             Your Updated Medication List - Protect others around you: Learn how to safely use, store and throw away your medicines at www.disposemymeds.org.          This list is accurate as of 6/28/18  9:43 AM.  Always use your most recent med list.                   Brand Name Dispense Instructions for use Diagnosis    * buPROPion 150 MG 24 hr tablet    WELLBUTRIN XL    30 tablet    TAKE 1 TABLET (150 MG) BY MOUTH EVERY MORNING    Depression with anxiety       * buPROPion 150 MG 24 hr tablet    WELLBUTRIN XL    60 tablet    Take 1 tablet (150 mg) by mouth every morning One a day for 3 days then 2 a day    Depression with anxiety       doxycycline 100 MG capsule    VIBRAMYCIN    90 capsule    TAKE 1 CAPSULE BY MOUTH DAILY    Rosacea       metroNIDAZOLE 0.75 % topical gel    METROGEL    60 g    Apply topically 2 times daily    Rosacea       * Notice:  This list has 2 medication(s) that are the same as other medications prescribed for you. Read the directions carefully, and ask your doctor or other care provider to review them with you.

## 2018-06-28 NOTE — PROGRESS NOTES
Patient had appointment with her primary care physician. [facility] services were offered. No immediate safety/risk issues were reported or identified.  Explained the role of the [facility] and provided contact information for the [facility].  New appointment scheduled for 7/6.    Shantel Gipson, Behavioral Health Clinician

## 2018-06-28 NOTE — PATIENT INSTRUCTIONS
PLAN:   1.   Symptomatic therapy suggested: restart wellbutrin once a day   Increase calcium to 1000mg and 800iu Vit D  2.  Orders Placed This Encounter   Medications     doxycycline (VIBRAMYCIN) 100 MG capsule     Sig: TAKE 1 CAPSULE BY MOUTH DAILY     Dispense:  90 capsule     Refill:  1     metroNIDAZOLE (METROGEL) 0.75 % topical gel     Sig: Apply topically 2 times daily     Dispense:  60 g     Refill:  3     buPROPion (WELLBUTRIN XL) 150 MG 24 hr tablet     Sig: Take 1 tablet (150 mg) by mouth every morning One a day for 3 days then 2 a day     Dispense:  60 tablet     Refill:  1     Orders Placed This Encounter   Procedures     MA Screening Digital Bilateral     JUST IN CASE     CBC with platelets     Comprehensive metabolic panel     Lipid panel reflex to direct LDL Fasting     TSH with free T4 reflex     INR     DERMATOLOGY REFERRAL       3. Patient needs to follow up in if no improvement,or sooner if worsening of symptoms or other symptoms develop.  FURTHER TESTING:       - mammogram  CONSULTATION/REFERRAL to Dermatology  Initiated consultation with BAR Bledsoe, Behavioral Health Clinician for mental health counseling.   Will follow up and/or notify patient on results via phone or mail to determine further need for followup  Follow up in 1 month.  Follow up office visit in one year for annual health maintenance exam, sooner PRN.    Preventive Health Recommendations  Female Ages 50 - 64    Yearly exam: See your health care provider every year in order to  o Review health changes.   o Discuss preventive care.    o Review your medicines if your doctor has prescribed any.      Get a Pap test every three years (unless you have an abnormal result and your provider advises testing more often).    If you get Pap tests with HPV test, you only need to test every 5 years, unless you have an abnormal result.     You do not need a Pap test if your uterus was removed (hysterectomy) and you have not had  cancer.    You should be tested each year for STDs (sexually transmitted diseases) if you're at risk.     Have a mammogram every 1 to 2 years.    Have a colonoscopy at age 50, or have a yearly FIT test (stool test). These exams screen for colon cancer.      Have a cholesterol test every 5 years, or more often if advised.    Have a diabetes test (fasting glucose) every three years. If you are at risk for diabetes, you should have this test more often.     If you are at risk for osteoporosis (brittle bone disease), think about having a bone density scan (DEXA).    Shots: Get a flu shot each year. Get a tetanus shot every 10 years.    Nutrition:     Eat at least 5 servings of fruits and vegetables each day.    Eat whole-grain bread, whole-wheat pasta and brown rice instead of white grains and rice.    Get adequate Calcium and Vitamin D.     Lifestyle    Exercise at least 150 minutes a week (30 minutes a day, 5 days a week). This will help you control your weight and prevent disease.    Limit alcohol to one drink per day.    No smoking.     Wear sunscreen to prevent skin cancer.     See your dentist every six months for an exam and cleaning.    See your eye doctor every 1 to 2 years.  It was a pleasure seeing you today at the [facility]. Thank you for allowing us to care for you today. We truly hope we provided you with the excellent service you deserve. Please let us know if there is anything else we can do for you so we can be sure you are leaving completley satisfied with your care experience.       General information about your clinic   Clinic Hours Lab Hours (Appointments are required)   Mon-Thurs: 7:30 AM - 7 PM Mon-Thurs: 7:30 AM - 7 PM   Fri: 7:30 AM - 5 PM Fri: 7:30 AM - 5 PM        After Hours Nurse Advise & Appts:  [facility]: [phone]  [facility] On Call: to make appointments anytime: [phone] On Call Physician: call [phone] and answering service will  page the on call physician.        For urgent appointments, please call [phone] and ask for the triage nurse or your care team clinic nurse.  How to contact my care team:  [facility]: [email or web address]   Phone: [phone]   Fax: [phone]       [facility]:   Phone: [phone]  Hours: 8:00 AM - 6:00 PM  Medication Refills:  Call your pharmacy and they will forward the refill to us. Please allow 3 business days for your refills to be completed.       Normal or non-critical lab and imaging results will be communicated to you by MyChart, letter or phone within 7 days.  If you do not hear from us within 10 days, please call the clinic. If you have a critical or abnormal lab result, we will notify you by phone as soon as possible.       We now have PWIC (Pediatric Walk in Care)  Monday-Friday from 7:30-4. Simply walk in and be seen for your urgent needs like cough, fever, rash, diarrhea or vomiting, pink eye, UTI. No appointments needed. Ask one of the team for more information      -Your Care Team:    Dr. Rogelio Angel - Internal Medicine/Pediatrics   Dr. Cruz Olivas - Family Medicine  Dr. Carlee San - Pediatrics  Dr. Katina Steele - Pediatrics  Zoraida Leal CNP - Family Practice Nurse Practitioner

## 2018-06-28 NOTE — LETTER
July 8, 2019      Minerva Rodríguez  [address] [facility] [address]              Dear Minerva,    In order to ensure that we are providing the best quality care, we would like to remind you that you are due for a mammogram for breast cancer screening. Please call [phone] to schedule your appointment. that was recently ordered by Holly Leal. Please let us know if you have any questions and we would be happy to help.     Thank you for trusting us with your care.    Sincerely,     [facility] Primary Care Team  [phone]

## 2018-06-29 DIAGNOSIS — F10.29 ALCOHOL DEPENDENCE WITH UNSPECIFIED ALCOHOL-INDUCED DISORDER (H): ICD-10-CM

## 2018-06-29 DIAGNOSIS — Z13.29 SCREENING FOR THYROID DISORDER: ICD-10-CM

## 2018-06-29 DIAGNOSIS — Z00.00 ENCOUNTER FOR ROUTINE ADULT HEALTH EXAMINATION WITHOUT ABNORMAL FINDINGS: ICD-10-CM

## 2018-06-29 LAB
ALBUMIN SERPL-MCNC: 4.3 G/DL (ref 3.4–5)
ALP SERPL-CCNC: 64 U/L (ref 40–150)
ALT SERPL W P-5'-P-CCNC: 35 U/L (ref 0–50)
ANION GAP SERPL CALCULATED.3IONS-SCNC: 6 MMOL/L (ref 3–14)
AST SERPL W P-5'-P-CCNC: 34 U/L (ref 0–45)
BILIRUB SERPL-MCNC: 0.5 MG/DL (ref 0.2–1.3)
BUN SERPL-MCNC: 9 MG/DL (ref 7–30)
CALCIUM SERPL-MCNC: 9.4 MG/DL (ref 8.5–10.1)
CHLORIDE SERPL-SCNC: 101 MMOL/L (ref 94–109)
CHOLEST SERPL-MCNC: 219 MG/DL
CO2 SERPL-SCNC: 30 MMOL/L (ref 20–32)
CREAT SERPL-MCNC: 0.64 MG/DL (ref 0.52–1.04)
DEPRECATED CALCIDIOL+CALCIFEROL SERPL-MC: 26 UG/L (ref 20–75)
ERYTHROCYTE [DISTWIDTH] IN BLOOD BY AUTOMATED COUNT: 12.2 % (ref 10–15)
GFR SERPL CREATININE-BSD FRML MDRD: >90 ML/MIN/1.7M2
GLUCOSE SERPL-MCNC: 93 MG/DL (ref 70–99)
HCT VFR BLD AUTO: 42.5 % (ref 35–47)
HDLC SERPL-MCNC: 94 MG/DL
HGB BLD-MCNC: 14.4 G/DL (ref 11.7–15.7)
INR PPP: 0.88 (ref 0.86–1.14)
LDLC SERPL CALC-MCNC: 108 MG/DL
MCH RBC QN AUTO: 33.1 PG (ref 26.5–33)
MCHC RBC AUTO-ENTMCNC: 33.9 G/DL (ref 31.5–36.5)
MCV RBC AUTO: 98 FL (ref 78–100)
NONHDLC SERPL-MCNC: 125 MG/DL
PLATELET # BLD AUTO: 315 10E9/L (ref 150–450)
POTASSIUM SERPL-SCNC: 4.3 MMOL/L (ref 3.4–5.3)
PROT SERPL-MCNC: 7.9 G/DL (ref 6.8–8.8)
RBC # BLD AUTO: 4.35 10E12/L (ref 3.8–5.2)
SODIUM SERPL-SCNC: 137 MMOL/L (ref 133–144)
TRIGL SERPL-MCNC: 83 MG/DL
TSH SERPL DL<=0.005 MIU/L-ACNC: 1.1 MU/L (ref 0.4–4)
WBC # BLD AUTO: 5.5 10E9/L (ref 4–11)

## 2018-06-29 PROCEDURE — 36415 COLL VENOUS BLD VENIPUNCTURE: CPT | Performed by: INTERNAL MEDICINE

## 2018-06-29 PROCEDURE — 85027 COMPLETE CBC AUTOMATED: CPT | Performed by: INTERNAL MEDICINE

## 2018-06-29 PROCEDURE — 80061 LIPID PANEL: CPT | Performed by: INTERNAL MEDICINE

## 2018-06-29 PROCEDURE — 85610 PROTHROMBIN TIME: CPT | Performed by: INTERNAL MEDICINE

## 2018-06-29 PROCEDURE — 82306 VITAMIN D 25 HYDROXY: CPT | Performed by: INTERNAL MEDICINE

## 2018-06-29 PROCEDURE — 80053 COMPREHEN METABOLIC PANEL: CPT | Performed by: INTERNAL MEDICINE

## 2018-06-29 PROCEDURE — 84443 ASSAY THYROID STIM HORMONE: CPT | Performed by: INTERNAL MEDICINE

## 2018-06-29 ASSESSMENT — ANXIETY QUESTIONNAIRES: GAD7 TOTAL SCORE: 7

## 2018-06-29 ASSESSMENT — PATIENT HEALTH QUESTIONNAIRE - PHQ9: SUM OF ALL RESPONSES TO PHQ QUESTIONS 1-9: 25

## 2018-07-06 ENCOUNTER — OFFICE VISIT (OUTPATIENT)
Dept: PSYCHOLOGY | Facility: CLINIC | Age: 53
End: 2018-07-06
Payer: COMMERCIAL

## 2018-07-06 DIAGNOSIS — F34.1 PERSISTENT DEPRESSIVE DISORDER: ICD-10-CM

## 2018-07-06 DIAGNOSIS — F40.10 SOCIAL ANXIETY DISORDER: Primary | ICD-10-CM

## 2018-07-06 PROCEDURE — 90791 PSYCH DIAGNOSTIC EVALUATION: CPT | Performed by: SOCIAL WORKER

## 2018-07-06 ASSESSMENT — ANXIETY QUESTIONNAIRES
1. FEELING NERVOUS, ANXIOUS, OR ON EDGE: SEVERAL DAYS
IF YOU CHECKED OFF ANY PROBLEMS ON THIS QUESTIONNAIRE, HOW DIFFICULT HAVE THESE PROBLEMS MADE IT FOR YOU TO DO YOUR WORK, TAKE CARE OF THINGS AT HOME, OR GET ALONG WITH OTHER PEOPLE: VERY DIFFICULT
6. BECOMING EASILY ANNOYED OR IRRITABLE: SEVERAL DAYS
2. NOT BEING ABLE TO STOP OR CONTROL WORRYING: MORE THAN HALF THE DAYS
3. WORRYING TOO MUCH ABOUT DIFFERENT THINGS: MORE THAN HALF THE DAYS
GAD7 TOTAL SCORE: 9
7. FEELING AFRAID AS IF SOMETHING AWFUL MIGHT HAPPEN: MORE THAN HALF THE DAYS
5. BEING SO RESTLESS THAT IT IS HARD TO SIT STILL: NOT AT ALL

## 2018-07-06 ASSESSMENT — PATIENT HEALTH QUESTIONNAIRE - PHQ9: 5. POOR APPETITE OR OVEREATING: SEVERAL DAYS

## 2018-07-06 NOTE — Clinical Note
Minerva Nieves came to see me last week and we talked about her depression, social anxiety, and alcohol intake. She was receptive to beginning to treat her anxiety and depression, but was reserved about formal chemical dependency treatment (but reservations are likely due to her anxiety). She has scheduled return visits with me, and I hope that I can help target some of her anxiety, provide brief support related to her alcohol use, and then maybe she will slowly become ready for formal alcohol dependency treatment! I'll keep you posted!  Thanks, Shantel

## 2018-07-06 NOTE — PROGRESS NOTES
"[facility]  Integrated Behavioral Health Services   Diagnostic Assessment      PATIENT'S NAME: Minerva Rodríguez  MRN:   [id number]  :   1965  DATE OF SERVICE: 2018  SERVICE LOCATION: Face to Face in Clinic  Visit Activities: NEW and [facility] Only      Identifying Information:  Patient is a 53 year old year old, ,  female.  Patient attended the session alone.      Referral:  Patient was referred for an assessment by ANGI Kelly, ARI.   Reason for referral: clarify behavioral health diagnosis and determine behavioral health treatment options.       Patient's Statement of Presenting Concern:  Patient reports the following reason(s) for seeking an assessment at this time: Patient reported concerns about her anxiety and alcohol consumption.  Patient stated that her symptoms have resulted in the following functional impairments: management of the household and or completion of tasks, self-care and work / vocational responsibilities    History of Presenting Concern:  Patient reports history of depression symptoms as a child. She stated that she remembers being a \"loner\" and preferring to be alone.  Patient reported that her parents were concerned about her behaviors, and she attended therapy when she was 14 years old.  Patient reported that symptoms of depression have increased and decreased since childhood, with first being prescribed medication approximately in . Most recently, she stated that her symptoms have been worsening since 2017. Patient did not identify a trigger or contributing factor to worsening symptoms.    Patient stated that most days she finds decreased interest,motivation, and pleasure in completing activities. Per patient, without a schedule, she doubts that she would complete much during the day. She reported that she feels an underlying sadness for no known reason. She reported that her sleep can vary, as she can experience difficulties " falling asleep or staying sleep. Patient stated that she frequently feels poorly about herself since she views herself as different due to her anxiety.  She reported that her preferences to be alone as led to more tension in her marriage.  Patient reported that she also now experiences fleeting suicidal thoughts (onset in past year), which she has never experienced before. Patient stated that she is scared of the thoughts, and could never act on the thoughts. Patient denied any fleeting suicidal thoughts in the past year.     Patient discussed concerns about social anxiety. She stated that she finds it difficult to meet new people, and will avoid social situations. She reported that she worries about what she may say, how she will respond to others, and worries that she may be judged. Patient stated that she worries if she will be accepted. Patient stated that she has significant anxiety and can feel physically sick prior to staff development meetings.  Patient reported that she interacts minimally with co-workers unless there is a defined purpose. Patient reported additional sources of anxiety including driving (worries that she may be in a car accident) and stated that she frequently has to sit in the back seat of the car and will avoid highways. Patient denied panic attacks or physical symptoms of anxiety.     Patient reported that she had her first alcoholic drink at age 17, but then started to drink more at age 35. Patient stated that she drinking helps her to cope with anxiety since she feels relaxed and does not have the same racing thoughts. Patient reported that she drinks 4 or more times a week, she will consume 5 or 6 alcoholic drinks. She stated that she drinks 1 per hours, and feels that it is controlled. She stated that she wants more after the first drink, and will crave the subsequent drinks.  Patient stated that she does not experience withdrawal symptoms, and denied adverse outcomes due to her  "substance use. She reported that she is concerned about how it may impact her long term health.  Patient identified goal of cutting back and decreasing use. She stated that she feels as though if anxiety is treated, she will be able to cut back on her alcohol use. Per patient, she feels confident, interested, and motivated to address her alcohol intake.     Patient has attempted to resolve these concerns in the past through: medication(s) from physician / PCP. Patient reports that other professional(s) are involved in providing support / services. PCP for medication management, with recent increased dose of Wellbutrin.    Social History:  Patient reported she grew up in [place], [place]. They were the second born of 3 children.  Patient reported that her childhood was \"normal\".  Patient reported a history of 1 committed relationships or marriages. Patient has been  for 29 years. Patient reported having 3 children, ages 31, 28, 25. Patient identified few stable and meaningful social connections. She stated that she has always had a difficult time maintaining friendships because she prefers to be alone and has anxiety about being in social situations.     Patient lives with , her son, his wife, and their son, and her daughter and her granddaughter. Patient is currently employed full time.  Work history: Patient stated that she has a history of being a . 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 She reported that she will be changing positions during this upcoming school year , and will be in a school readiness program.     Patient reported that she has not been involved with the legal system.  Patient's highest education level was high school graduate. Patient did identify the following learning problems: concentration. There are no ethnic, cultural or Samaritan factors that may be relevant for therapy.  Patient " did not serve in the .     Mental Health History:  Patient reported the following biological family members or relatives with mental health issues: Father experienced Depression, Daughter experienced Anxiety. Patient has received the following mental health services in the past: counseling and medication(s) from physician / PCP. Patient is currently receiving the following services: medication(s) from physician / PCP.    Chemical Health History:  Patient reported the following biological family members or relatives with chemical health issues: Daughter reportedly uses alcohol , Father reportedly used alcohol . Patient has not received chemical dependency treatment in the past. Patient is not currently receiving any chemical dependency treatment. Patient reported the following problems as a result of drinking: concerns about long term health.    Cage-AID score is: 3.  PCP completed AUDIT on 6/28/18, with score of 22. Based on Cage-Aid score and clinical interview there  are indications of drug or alcohol abuse. Recommendation for substance abuse disorder evaluation with a substance use professional was given. Therapist did recommend client to reduce use or abstain from alcohol or substance use. Therapist did recommend structured treatment and or community support (AA, 12 step group, etc.).     Discussed the general effects of drugs and alcohol on health and well-being.    Significant Losses / Trauma / Abuse / Neglect Issues:  There are no indications or report of: significant losses, trauma, abuse or neglect.    Issues of possible neglect are not present.      Medical History:   Patient Active Problem List   Diagnosis     Depression with anxiety     Alcohol dependence (H)     Hypertension     Major depression in remission (H)     Cervical cancer screening     Rosacea       Medication Review:  Current Outpatient Prescriptions   Medication     buPROPion (WELLBUTRIN XL) 150 MG 24 hr tablet     buPROPion  (WELLBUTRIN XL) 150 MG 24 hr tablet     doxycycline (VIBRAMYCIN) 100 MG capsule     metroNIDAZOLE (METROGEL) 0.75 % topical gel     No current facility-administered medications for this visit.        Patient was provided recommendation to follow-up with physician.    Mental Status Assessment:  Appearance:   Appropriate   Eye Contact:   Good   Psychomotor Behavior: Normal   Attitude:   Cooperative   Orientation:   All  Speech   Rate / Production: Normal    Volume:  Normal   Mood:    Anxious   Affect:    Appropriate   Thought Content:  Clear   Thought Form:  Coherent  Logical   Insight:    Good       Safety Assessment:    Patient has had a history of suicidal ideation: fleeting, passive SI. No active SI, no plan, intention and denies a history of suicide attempts, self-injurious behavior, homicidal ideation, homicidal behavior and and other safety concerns  Patient denies current or recent suicidal ideation or behaviors.  Patient denies current or recent homicidal ideation or behaviors.  Patient denies current or recent self injurious behavior or ideation.  Patient denies other safety concerns.  Patient reports there are no firearms in the house  Protective Factors Children in the home  and Sense of responsibility to family   Risk Factors Abrupt changes in clinical condition and Rise or drop in anxiety that is sudden in nature      Plan for Safety and Risk Management:  A safety and risk management plan has not been developed at this time, however patient was encouraged to call [facility] should there be a change in any of these risk factors.      Review of Symptoms:  Depression: Sleep Interest Energy Concentration Appetite  [place]:  No symptoms  Psychosis: No symptoms  Anxiety: Worries Nervousness  Panic:  No symptoms  Post Traumatic Stress Disorder: No symptoms  Obsessive Compulsive Disorder: No symptoms  Eating Disorder: No symptoms  Oppositional Defiant Disorder: No symptoms  ADD / ADHD: No  symptoms  Conduct Disorder: No symptoms    Patient's Strengths and Limitations:  Patient identified the following strengths or resources that will help her succeed in counseling: commitment to health and well being, family support and intelligence. Patient identified the following supports: family. Things that may interfere with the patient's success in behavioral health services include:ambivalence regarding CD treatment.    Diagnostic Criteria:  Social Anxiety  A. fear or anxiety specific to social settings, in which a person feels noticed, observed, or scrutinized.  2. social interaction will consistently provoke distress,  3. social interactions are either avoided, or painfully and reluctantly endured,  4. the fear and anxiety will be grossly disproportionate to the actual situation,  5. the fear, anxiety or other distress around social situations will persist for six months or longer and cause personal distress and impairment of functioning in one or more domains, such as interpersonal or occupational functioning,  6. the fear or anxiety cannot be attributed to a medical disorder, substance use, or adverse medication effects or  another mental disorder, and    Persistent Depressive Disorder  A. Depressed mood for most of the day, for more days than not, as indicated either by subjective account or observation by others, for at least 2 years. Note: In children and adolescents, mood can be irritable and duration must be at least 1 year.   B. Presence, while depressed, of two (or more) of the following:        - insomnia or hypersomnia       - low energy or fatigue        - low self-esteem        - poor concentration or difficulty making decisions   C. During the 2-year period (1 year for children or adolescents) of the disturbance, the person has never been without the symptoms in Criteria A and B for more than 2 months at a time.  D. Criteria for a major depressive disorder may be continously present for 2  years  E. There has never been a Manic Episode, a Mixed Episode, or a Hypomanic Episode, and criteria have never been met for Cyclothymic Disorder.   F. The disturbance is not better explained by a persistent schizoaffective disorder, schizophrenia, delusional disorder, or other specified or unspecified schizophrenia spectrum and other psychotic disorder  G. The symptoms are not attributable to the physiological effects of a substance (e.g., a drug of abuse, a medication) or another medical condition (e.g., hypothyroidism).   H. The symptoms cause clinically significant distress or impairment in social, occupational, or other important areas of functioning.        - Early Onset: onset is before age 21 years       Functional Status:  Patient's symptoms are causing reduced functional status in the following areas: Occupational / Vocational - avoids social interactions, sick before social events  Social / Relational - isolates, withdraws, minimal interactions      DSM5 Diagnoses: (Sustained by DSM5 Criteria Listed Above)  Diagnoses: 300.4 (F34.1) Persistent Depressive Disorder, Early onset  300.23 (F40.10) Social Anxiety Disorder  Psychosocial & Contextual Factors: unhappy with work, will stat new job at beginning of new academic year  WHODAS Score: See Media section of EPIC medical record for completed WHODAS    Preliminary Treatment Plan:    The client reports no currently identified Yazidi, ethnic or cultural issues relevant to therapy.    Initial Treatment will focus on: Anxiety - reduce racing thoughts, social anxiety  Alcohol / Substance Use - reduce use.    Chemical dependency recommendations: [facility] recommended formal chemical dependency treatment. Patient reported anxiety secondary to attending treatment in group setting, expressed confidence, interest, and motivation to treat by self    As a preliminary treatment goal, patient will experience a reduction in anxiety, will develop more effective coping skills  to manage anxiety symptoms, will develop healthy cognitive patterns and beliefs and will increase ability to function adaptively and will increase understanding of the effects of substance use  will make healthier choices in regard to substance use  will discuss/consider potential need for formal substance use evaluation, treatment and/or support  continue to make healthy choices regarding substance use and engage in activities / supportive services that promote sobriety.    The focus of initial interventions will be to alleviate anxiety, teach CBT skills, teach DBT skills, teach distress tolerance skills, teach emotional regulation, teach mindfulness skills and teach relaxation strategies.    The patient is receiving treatment / structured support from the following professional(s) / service and treatment. Collaboration will be initiated with: primary care physician.    The following referral(s) was/were discussed but patient declines follow up at this time. CD treatment.    A Release of Information is not needed at this time.    Report to child or adult protection services was NA.    Shantel Gipson [facility], Behavioral Health Clinician

## 2018-07-10 PROBLEM — F34.1 PERSISTENT DEPRESSIVE DISORDER: Status: ACTIVE | Noted: 2018-07-10

## 2018-07-11 ASSESSMENT — ANXIETY QUESTIONNAIRES: GAD7 TOTAL SCORE: 9

## 2018-07-11 ASSESSMENT — PATIENT HEALTH QUESTIONNAIRE - PHQ9: SUM OF ALL RESPONSES TO PHQ QUESTIONS 1-9: 20

## 2018-07-13 ENCOUNTER — OFFICE VISIT (OUTPATIENT)
Dept: PSYCHOLOGY | Facility: CLINIC | Age: 53
End: 2018-07-13
Payer: COMMERCIAL

## 2018-07-13 DIAGNOSIS — F34.1 PERSISTENT DEPRESSIVE DISORDER: ICD-10-CM

## 2018-07-13 DIAGNOSIS — F40.10 SOCIAL ANXIETY DISORDER: Primary | ICD-10-CM

## 2018-07-13 PROCEDURE — 90832 PSYTX W PT 30 MINUTES: CPT | Performed by: SOCIAL WORKER

## 2018-07-13 NOTE — PROGRESS NOTES
[facility]  July 13, 2018      Behavioral Health Clinician Progress Note    Patient Name: Minerva Rodríguez           Service Type:  Individual      Service Location:   Face to Face in Clinic     Session Start Time: 1: 43 pm  Session End Time: 2: 20 pm       Session Length: 16 - 37      Attendees: Patient    Visit Activities (Refresh list every visit): [facility] Only    Diagnostic Assessment Date: 7/6/18  Treatment Plan Review Date: 7/13/18  See Flowsheets for today's PHQ-9 and BHARAT-7 results  Previous PHQ-9:   PHQ-9 SCORE 6/15/2017 6/28/2018 7/6/2018   Total Score 0 25 20     Previous BHARAT-7:   BHARAT-7 SCORE 6/20/2017 6/28/2018 7/6/2018   Total Score 2 7 9       NELI LEVEL:  NELI Score (Last Two) 11/4/2014   NELI Raw Score 42   Activation Score 66   NELI Level 3       DATA  Extended Session (60+ minutes): No  Interactive Complexity: No  Crisis: No  [facility] Patient: No    Treatment Objective(s) Addressed in This Session:  Target Behavior(s): alcohol use and anxiety    Anxiety: will develop more effective coping skills to manage anxiety symptoms  Alcohol / Substance Use: will make healthier choices in regard to substance use  continue to make healthy choices regarding substance use and engage in activities / supportive services that promote sobriety    Current Stressors / Issues:  Patient reported that she believes she has experienced decreased cravings to drink and a decreased urgency to drink since previous week. She reported concerns about potential side effects from medication, but reported that the symptoms have gone away.  Patient denied physical symptoms of anxiety, stated that she primarily has cognitions present with her anxiety. She was unable to identify specific cognitions since she tends to try to distract herself and avoiding thinking about her anxieties. Patient agreed to complete a thought log in order to identify her automatic thoughts.  [facility] and patient reviewed previously learned distraction  techniques.  C continued to explore mindfulness techniques by sensory engagement.  [facility] also introduced concepts of guided imagery and breathing. Patient also expressed interest in yoga.     Patient reported that she has been drinking 5 drinks per day, which is one less per day than previously.  Patient stated that she is not sure at the pace in which she wants to decrease, but stated that she will consider.  [facility] introduced concepts such as delaying her choice and pacing. Patient and [facility] created a list of activities to do instead of drinking and when she experiences an urge to drink. Patient and [facility] reviewed what to think about and consider when she is delaying her choice, including reasons to change and reasons to reduce drinking.    Progress on Treatment Objective(s) / Homework:  Satisfactory progress - ACTION (Actively working towards change); Intervened by reinforcing change plan / affirming steps taken    Motivational Interviewing    MI Intervention: Expressed Empathy/Understanding, Supported Autonomy, Collaboration, Evocation, Permission to raise concern or advise, Open-ended questions, Change talk (evoked) and Reframe     Change Talk Expressed by the Patient: Reasons to change Need to change Committment to change    Provider Response to Change Talk: E - Evoked more info from patient about behavior change, A - Affirmed patient's thoughts, decisions, or attempts at behavior change, R - Reflected patient's change talk and S - Summarized patient's change talk statements    Also provided psychoeducation about behavioral health condition, symptoms, and treatment options    Care Plan review completed: Yes    Medication Review:  No changes to current psychiatric medication(s)    Medication Compliance:  Yes    Changes in Health Issues:   None reported    Chemical Use Review:   Substance Use: decrease in alcohol .  Client reports frequency of use 5 drinks every day.  Reviewed information and resources for treatment  and ongoing sobriety  Patient assessed present costs and future losses as a result of substance use  Provided support and affirmation for steps taken towards sobriety         Tobacco Use: No current tobacco use.      Assessment: Current Emotional / Mental Status (status of significant symptoms):  Risk status (Self / Other harm or suicidal ideation)  Patient has had a history of suicidal ideation: fleeting, passive SI and denies a history of suicide attempts, self-injurious behavior, homicidal ideation, homicidal behavior and and other safety concerns  Patient denies current fears or concerns for personal safety.  Patient denies current or recent suicidal ideation or behaviors.  Patient denies current or recent homicidal ideation or behaviors.  Patient denies current or recent self injurious behavior or ideation.  Patient denies other safety concerns.  A safety and risk management plan has not been developed at this time, however patient was encouraged to call [place] [phone] should there be a change in any of these risk factors.    Appearance:   Appropriate   Eye Contact:   Good   Psychomotor Behavior: Normal   Attitude:   Cooperative   Orientation:   All  Speech   Rate / Production: Normal    Volume:  Normal   Mood:    Normal  Affect:    Appropriate   Thought Content:  Clear   Thought Form:  Coherent  Logical   Insight:    Good     Diagnoses:  1. Social anxiety disorder    2. Persistent depressive disorder        Collateral Reports Completed:  Not Applicable    Plan: (Homework, other):  Patient was given information about behavioral services and encouraged to schedule a follow up appointment with the clinic [facility] in 2 weeks.  She was also given relaxation techniques, strategies to reduce alcohol.  CD Recommendations: reduce use, consider benefits of CD treatment.  Shantel Gipson, BAR, [facility]      ______________________________________________________________________    Integrated Primary Care Behavioral Health  Treatment Plan    Patient's Name: Minerva Rodríguez  YOB: 1965    Date: 7/13/18    DSM-V Diagnoses: 300.4 (F34.1) Persistent Depressive Disorder, Early onset or 300.23 (F40.10) Social Anxiety Disorder  Psychosocial / Contextual Factors: unhappy with work, will start new job at beginning of new academic year  WHODAS: See Media section of [facility] medical record for completed WHODAS    Referral / Collaboration:  Referral to another professional/service is not indicated at this time..    Anticipated number of session or this episode of care: 4-6      MeasurableTreatment Goal(s) related to diagnosis / functional impairment(s)  Goal 1: Patient will reduce symptoms of anxiety as evidenced by decreased BHARAT 7.    I will know I've met my goal when I worry less about new social situations.      Objective #A (Patient Action)    Patient will use cognitive strategies identified in therapy to challenge anxious thoughts.  Status: New - Date: 7/13/18     Intervention(s)  [facility] will teach cognitive restructuring.    Objective #B  Patient will practice relaxation techniques 1-2 times per day.  Status: New - Date: 7/13/18     Intervention(s)  [facility] will assign homework to practice relaxation techniques.    Objective #C  Patient will increase length and frequency of contact with others.  Status: New - Date: 7/13/18     Intervention(s)  [facility] will assist with gradual exposure activities.      Goal 2: Patient will reduce symptoms of depression as evidenced by decreased PHQ 9.     I will know I've met my goal when I feel less sad and down.      Objective #A (Patient Action)    Status: New - Date: 7/13/18     Patient will Increase interest, engagement, and pleasure in doing things.    Intervention(s)  [facility] will assign homework to assist with activity scheduling.    Objective #B  Patient will Identify negative self-talk and behaviors: challenge core beliefs, myths, and actions.    Status: New - Date: 7/13/18     Intervention(s)  [facility] will  assist patient to identify automatic negative thoughts and unhealthy core beliefs, and will teach restructuring techniques.        Patient has reviewed and agreed to the above plan.      Shantel Gipson, [facility]  July 13, 2018

## 2018-08-09 ENCOUNTER — OFFICE VISIT (OUTPATIENT)
Dept: PEDIATRICS | Facility: CLINIC | Age: 53
End: 2018-08-09
Payer: COMMERCIAL

## 2018-08-09 VITALS
BODY MASS INDEX: 20.74 KG/M2 | HEIGHT: 64 IN | TEMPERATURE: 98.7 F | HEART RATE: 87 BPM | SYSTOLIC BLOOD PRESSURE: 124 MMHG | DIASTOLIC BLOOD PRESSURE: 66 MMHG | WEIGHT: 121.5 LBS | OXYGEN SATURATION: 99 %

## 2018-08-09 DIAGNOSIS — F41.8 DEPRESSION WITH ANXIETY: ICD-10-CM

## 2018-08-09 PROCEDURE — 99213 OFFICE O/P EST LOW 20 MIN: CPT | Performed by: NURSE PRACTITIONER

## 2018-08-09 RX ORDER — BUPROPION HYDROCHLORIDE 300 MG/1
300 TABLET ORAL EVERY MORNING
Qty: 90 TABLET | Refills: 1 | Status: SHIPPED | OUTPATIENT
Start: 2018-08-09 | End: 2019-04-29

## 2018-08-09 RX ORDER — ESCITALOPRAM OXALATE 10 MG/1
5 TABLET ORAL DAILY
Qty: 30 TABLET | Refills: 1 | Status: SHIPPED | OUTPATIENT
Start: 2018-08-09 | End: 2020-04-12

## 2018-08-09 ASSESSMENT — ANXIETY QUESTIONNAIRES
5. BEING SO RESTLESS THAT IT IS HARD TO SIT STILL: MORE THAN HALF THE DAYS
IF YOU CHECKED OFF ANY PROBLEMS ON THIS QUESTIONNAIRE, HOW DIFFICULT HAVE THESE PROBLEMS MADE IT FOR YOU TO DO YOUR WORK, TAKE CARE OF THINGS AT HOME, OR GET ALONG WITH OTHER PEOPLE: NOT DIFFICULT AT ALL
1. FEELING NERVOUS, ANXIOUS, OR ON EDGE: MORE THAN HALF THE DAYS
2. NOT BEING ABLE TO STOP OR CONTROL WORRYING: MORE THAN HALF THE DAYS
3. WORRYING TOO MUCH ABOUT DIFFERENT THINGS: MORE THAN HALF THE DAYS
7. FEELING AFRAID AS IF SOMETHING AWFUL MIGHT HAPPEN: SEVERAL DAYS
6. BECOMING EASILY ANNOYED OR IRRITABLE: SEVERAL DAYS
GAD7 TOTAL SCORE: 11

## 2018-08-09 ASSESSMENT — PATIENT HEALTH QUESTIONNAIRE - PHQ9: 5. POOR APPETITE OR OVEREATING: SEVERAL DAYS

## 2018-08-09 NOTE — PROGRESS NOTES
SUBJECTIVE:   Minerva Rodríguez is a 53 year old female who presents to clinic today for the following health issues:      Depression and Anxiety Follow-Up    Status since last visit: Improved a little bit    Other associated symptoms:side effects/headache for 2 weeks when she 1st started wellbutrin    Complicating factors:     Significant life event: No     Current substance abuse: Alcohol but it is getting better with the Wellbutrin    PHQ-9 6/28/2018 7/6/2018 8/9/2018   Total Score 25 20 7   Q9: Suicide Ideation Several days Several days Not at all     BHARAT-7 SCORE 6/28/2018 7/6/2018 8/9/2018   Total Score 7 9 11     In the past two weeks have you had thoughts of suicide or self-harm?  No.    Do you have concerns about your personal safety or the safety of others?   No     Is doing some better  Sleep is better   Some anxiety yet   Struggle with thought of going to work again  Was seeing Allison twice and is following through  Drinking a lot less alcohol   Now can go a day without and does fine   That need for a drink has diminished   Daughter and granddaughter   Son and his wife are all living with her and  until October   They are all in school     PHQ-9  English  PHQ-9   Any Language  BHARAT-7  Suicide Assessment Five-step Evaluation and Treatment (SAFE-T)    Amount of exercise or physical activity: 6-7 days/week for an average of 30-45 minutes    Problems taking medications regularly: No    Medication side effects:headaches are gone now, patient does have to take medication at night because it makes her dizzy and lightheaded symptoms may have gone away because she doesn't feel any symptoms in the am.    Diet: low fat/cholesterol    Problem list and histories reviewed & adjusted, as indicated.  Additional history: as documented    Patient Active Problem List   Diagnosis     Social anxiety disorder     Alcohol dependence (H)     Hypertension     Major depression in remission (H)     Cervical cancer screening      Rosacea     Persistent depressive disorder     Past Surgical History:   Procedure Laterality Date     COLONOSCOPY WITH CO2 INSUFFLATION N/A 4/14/2017    Procedure: COLONOSCOPY WITH CO2 INSUFFLATION;  Surgeon: Duane, William Charles, MD;  Location: [facility] OR     NO HISTORY OF SURGERY         Social History   Substance Use Topics     Smoking status: Never Smoker     Smokeless tobacco: Never Used     Alcohol use 25.2 oz/week     42 Cans of beer per week     Family History   Problem Relation Age of Onset     Hypertension Father      Depression Father      Substance Abuse Father      Depression Brother      Cancer Maternal Aunt      Other Cancer Maternal Aunt      Diabetes No family hx of      Coronary Artery Disease No family hx of      Hyperlipidemia No family hx of      Cerebrovascular Disease No family hx of      Breast Cancer No family hx of      Colon Cancer No family hx of      Asthma No family hx of      Thyroid Disease No family hx of          Current Outpatient Prescriptions   Medication Sig Dispense Refill     buPROPion (WELLBUTRIN XL) 150 MG 24 hr tablet Take 1 tablet (150 mg) by mouth every morning One a day for 3 days then 2 a day 60 tablet 1     doxycycline (VIBRAMYCIN) 100 MG capsule TAKE 1 CAPSULE BY MOUTH DAILY 90 capsule 1     metroNIDAZOLE (METROGEL) 0.75 % topical gel Apply topically 2 times daily 60 g 3     buPROPion (WELLBUTRIN XL) 150 MG 24 hr tablet TAKE 1 TABLET (150 MG) BY MOUTH EVERY MORNING (Patient not taking: Reported on 8/9/2018) 30 tablet 0     Allergies   Allergen Reactions     Other [Seasonal Allergies] Rash and Blisters     Hair color      Labs reviewed in EPIC    Reviewed and updated as needed this visit by clinical staff  Tobacco  Allergies  Med Hx  Surg Hx  Fam Hx  Soc Hx      Reviewed and updated as needed this visit by Provider         ROS:  Constitutional, HEENT, cardiovascular, pulmonary, gi and gu systems are negative, except as otherwise noted.    OBJECTIVE:     /66 (BP  "Location: Right arm, Patient Position: Sitting, Cuff Size: Adult Regular)  Pulse 87  Temp 98.7  F (37.1  C) (Temporal)  Ht 5' 4.25\" (1.632 m)  Wt 121 lb 8 oz (55.1 kg)  SpO2 99%  BMI 20.69 kg/m2  Body mass index is 20.69 kg/(m^2).  GENERAL APPEARANCE: alert, active and no distress  NECK: no adenopathy  RESP: lungs clear to auscultation - no rales, rhonchi or wheezes  CV: regular rates and rhythm  MS: extremities normal- no gross deformities noted  NEURO: Normal strength and tone, mentation intact and speech normal  PSYCH: mentation appears normal and affect normal/bright  MENTAL STATUS EXAM:  Appearance/Behavior: No apparent distress, Casually groomed and Dressed appropriately for weather  Speech: Normal  Mood/Affect: normal affect  Insight: Adequate    Diagnostic Test Results:  none     ASSESSMENT/PLAN:     Minerva was seen today for recheck medication.    Diagnoses and all orders for this visit:    Depression with anxiety  -     buPROPion (WELLBUTRIN XL) 300 MG 24 hr tablet; Take 1 tablet (300 mg) by mouth every morning  -     escitalopram (LEXAPRO) 10 MG tablet; Take 0.5 tablets (5 mg) by mouth daily      PLAN:   1.   Symptomatic therapy suggested: will add lexapro 5 mg a day.  2.  Orders Placed This Encounter   Medications     buPROPion (WELLBUTRIN XL) 300 MG 24 hr tablet     Sig: Take 1 tablet (300 mg) by mouth every morning     Dispense:  90 tablet     Refill:  1     escitalopram (LEXAPRO) 10 MG tablet     Sig: Take 0.5 tablets (5 mg) by mouth daily     Dispense:  30 tablet     Refill:  1     3. Patient needs to follow up in if no improvement,or sooner if worsening of symptoms or other symptoms develop.  Follow up in 1 month on My chart   Continue with counseling     See Patient Instructions    ANGI Simon [facility]  "

## 2018-08-09 NOTE — MR AVS SNAPSHOT
After Visit Summary   8/9/2018    Minerva Rodríguez    MRN: [id number]           Patient Information     Date Of Birth          1965        Visit Information        Provider Department      8/9/2018 2:10 PM Zoraida Leal APRN CNP [facility]        Today's Diagnoses     Depression with anxiety          Care Instructions    PLAN:   1.   Symptomatic therapy suggested: will add lexapro 5 mg a day.  2.  Orders Placed This Encounter   Medications     buPROPion (WELLBUTRIN XL) 300 MG 24 hr tablet     Sig: Take 1 tablet (300 mg) by mouth every morning     Dispense:  90 tablet     Refill:  1     escitalopram (LEXAPRO) 10 MG tablet     Sig: Take 0.5 tablets (5 mg) by mouth daily     Dispense:  30 tablet     Refill:  1     3. Patient needs to follow up in if no improvement,or sooner if worsening of symptoms or other symptoms develop.  Follow up in 1 month on My chart   Continue with counseling     It was a pleasure seeing you today at the [facility]. Thank you for allowing us to care for you today. We truly hope we provided you with the excellent service you deserve. Please let us know if there is anything else we can do for you so we can be sure you are leaving completley satisfied with your care experience.       General information about your clinic   Clinic Hours Lab Hours (Appointments are required)   Mon-Thurs: 7:30 AM - 7 PM Mon-Thurs: 7:30 AM - 7 PM   Fri: 7:30 AM - 5 PM Fri: 7:30 AM - 5 PM        After Hours Nurse Advise & Appts:  [facility]: [phone]  [facility] On Call: to make appointments anytime: [phone] On Call Physician: call [phone] and answering service will page the on call physician.        For urgent appointments, please call [phone] and ask for the triage nurse or your care team clinic nurse.  How to contact my care team:  [facility]: [email or web address]   Phone: [phone]   Fax: [phone]        [facility]:   Phone: [phone]  Hours: 8:00 AM - 6:00 PM  Medication Refills:  Call your pharmacy and they will forward the refill to us. Please allow 3 business days for your refills to be completed.       Normal or non-critical lab and imaging results will be communicated to you by MyChart, letter or phone within 7 days.  If you do not hear from us within 10 days, please call the clinic. If you have a critical or abnormal lab result, we will notify you by phone as soon as possible.       We now have PWIC (Pediatric Walk in Care)  Monday-Friday from 7:30-4. Simply walk in and be seen for your urgent needs like cough, fever, rash, diarrhea or vomiting, pink eye, UTI. No appointments needed. Ask one of the team for more information      -Your Care Team:    Dr. Rogelio Angel - Internal Medicine/Pediatrics   Dr. Cruz Olivas - Family Medicine  Dr. Carlee San - Pediatrics  Dr. Katina Steele - Pediatrics  Zoraida Leal CNP - Family Practice Nurse Practitioner                             Follow-ups after your visit        Who to contact     If you have questions or need follow up information about today's clinic visit or your schedule please contact [facility] directly at [phone].  Normal or non-critical lab and imaging results will be communicated to you by MyChart, letter or phone within 4 business days after the clinic has received the results. If you do not hear from us within 7 days, please contact the clinic through MyChart or phone. If you have a critical or abnormal lab result, we will notify you by phone as soon as possible.  Submit refill requests through [employer] or call your pharmacy and they will forward the refill request to us. Please allow 3 business days for your refill to be completed.          Additional Information About Your Visit        Care EveryWhere ID     This is your Care EveryWhere ID. This could be used by other organizations to access your [facility]  "records  [id number]        Your Vitals Were     Pulse Temperature Height Pulse Oximetry BMI (Body Mass Index)       87 98.7  F (37.1  C) (Temporal) 5' 4.25\" (1.632 m) 99% 20.69 kg/m2        Blood Pressure from Last 3 Encounters:   08/09/18 124/66   06/28/18 109/70   03/26/18 150/88    Weight from Last 3 Encounters:   08/09/18 121 lb 8 oz (55.1 kg)   06/28/18 123 lb 14.4 oz (56.2 kg)   03/26/18 125 lb (56.7 kg)              Today, you had the following     No orders found for display         Today's Medication Changes          These changes are accurate as of 8/9/18  3:10 PM.  If you have any questions, ask your nurse or doctor.               Start taking these medicines.        Dose/Directions    escitalopram 10 MG tablet   Commonly known as:  LEXAPRO   Used for:  Depression with anxiety   Started by:  Zoraida Leal APRN CNP        Dose:  5 mg   Take 0.5 tablets (5 mg) by mouth daily   Quantity:  30 tablet   Refills:  1         These medicines have changed or have updated prescriptions.        Dose/Directions    buPROPion 300 MG 24 hr tablet   Commonly known as:  WELLBUTRIN XL   This may have changed:    - medication strength  - how much to take  - additional instructions  - Another medication with the same name was removed. Continue taking this medication, and follow the directions you see here.   Used for:  Depression with anxiety   Changed by:  Zoraida Leal APRN CNP        Dose:  300 mg   Take 1 tablet (300 mg) by mouth every morning   Quantity:  90 tablet   Refills:  1            Where to get your medicines      These medications were sent to [facility] [id number] [place] [facility] [address] [facility]  [address] [facility] [address]     Phone:  [phone]     buPROPion 300 MG 24 hr tablet    escitalopram 10 MG tablet                Primary Care Provider Office Phone # Fax #    ANGI Simon [phone] [address]  [place] [address]        Equal Access to Services     [facility]: Hadii aad cecille yayaaly Jesusali, wacynthiada luqadaha, qaybta kaalmada mike, adelaide bermeo. So [facility] [phone].    ATENCIÓN: Si habla español, tiene a shah disposición servicios gratuitos de asistencia lingüística. Kaviname al [phone].    We comply with applicable federal civil rights laws and [place] laws. We do not discriminate on the basis of race, color, national origin, age, disability, sex, sexual orientation, or gender identity.            Thank you!     Thank you for choosing [facility]  for your care. Our goal is always to provide you with excellent care. Hearing back from our patients is one way we can continue to improve our services. Please take a few minutes to complete the written survey that you may receive in the mail after your visit with us. Thank you!             Your Updated Medication List - Protect others around you: Learn how to safely use, store and throw away your medicines at www.disposemymeds.org.          This list is accurate as of 8/9/18  3:10 PM.  Always use your most recent med list.                   Brand Name Dispense Instructions for use Diagnosis    buPROPion 300 MG 24 hr tablet    WELLBUTRIN XL    90 tablet    Take 1 tablet (300 mg) by mouth every morning    Depression with anxiety       doxycycline 100 MG capsule    VIBRAMYCIN    90 capsule    TAKE 1 CAPSULE BY MOUTH DAILY    Rosacea       escitalopram 10 MG tablet    LEXAPRO    30 tablet    Take 0.5 tablets (5 mg) by mouth daily    Depression with anxiety       metroNIDAZOLE 0.75 % topical gel    METROGEL    60 g    Apply topically 2 times daily    Rosacea

## 2018-08-09 NOTE — PATIENT INSTRUCTIONS
PLAN:   1.   Symptomatic therapy suggested: will add lexapro 5 mg a day.  2.  Orders Placed This Encounter   Medications     buPROPion (WELLBUTRIN XL) 300 MG 24 hr tablet     Sig: Take 1 tablet (300 mg) by mouth every morning     Dispense:  90 tablet     Refill:  1     escitalopram (LEXAPRO) 10 MG tablet     Sig: Take 0.5 tablets (5 mg) by mouth daily     Dispense:  30 tablet     Refill:  1     3. Patient needs to follow up in if no improvement,or sooner if worsening of symptoms or other symptoms develop.  Follow up in 1 month on My chart   Continue with counseling     It was a pleasure seeing you today at the [facility]. Thank you for allowing us to care for you today. We truly hope we provided you with the excellent service you deserve. Please let us know if there is anything else we can do for you so we can be sure you are leaving completley satisfied with your care experience.       General information about your clinic   Clinic Hours Lab Hours (Appointments are required)   Mon-Thurs: 7:30 AM - 7 PM Mon-Thurs: 7:30 AM - 7 PM   Fri: 7:30 AM - 5 PM Fri: 7:30 AM - 5 PM        After Hours Nurse Advise & Appts:  [facility]: [phone]  [facility] On Call: to make appointments anytime: [phone] On Call Physician: call [phone] and answering service will page the on call physician.        For urgent appointments, please call [phone] and ask for the triage nurse or your care team clinic nurse.  How to contact my care team:  [facility]: [email or web address]   Phone: [phone]   Fax: [phone]       [facility]:   Phone: [phone]  Hours: 8:00 AM - 6:00 PM  Medication Refills:  Call your pharmacy and they will forward the refill to us. Please allow 3 business days for your refills to be completed.       Normal or non-critical lab and imaging results will be communicated to you by MyChart, letter or phone within 7 days.  If you do not hear from us  within 10 days, please call the clinic. If you have a critical or abnormal lab result, we will notify you by phone as soon as possible.       We now have PWIC (Pediatric Walk in Care)  Monday-Friday from 7:30-4. Simply walk in and be seen for your urgent needs like cough, fever, rash, diarrhea or vomiting, pink eye, UTI. No appointments needed. Ask one of the team for more information      -Your Care Team:    Dr. Rogelio Angel - Internal Medicine/Pediatrics   Dr. Cruz Olivas - Family Medicine  Dr. Carlee San - Pediatrics  Dr. Katina Steele - Pediatrics  Zoraida Leal CNP - Family Practice Nurse Practitioner

## 2018-08-10 ASSESSMENT — PATIENT HEALTH QUESTIONNAIRE - PHQ9: SUM OF ALL RESPONSES TO PHQ QUESTIONS 1-9: 7

## 2018-08-10 ASSESSMENT — ANXIETY QUESTIONNAIRES: GAD7 TOTAL SCORE: 11

## 2018-08-29 DIAGNOSIS — F41.8 DEPRESSION WITH ANXIETY: ICD-10-CM

## 2018-08-29 RX ORDER — BUPROPION HYDROCHLORIDE 150 MG/1
TABLET ORAL
Qty: 60 TABLET | Refills: 1 | OUTPATIENT
Start: 2018-08-29

## 2018-08-29 NOTE — TELEPHONE ENCOUNTER
Pharmacy requesting 150mg wellbutrin.     Dose changed.  New RX sent below.    Last Written Prescription Date:  buPROPion (WELLBUTRIN XL) 300 MG 24 hr tablet 90 tablet 1 8/9/2018  No      Sig - Route: Take 1 tablet (300 mg) by mouth every morning - Oral     Class: E-Prescribe     Order: [id number]     E-Prescribing Status: Receipt confirmed by pharmacy (8/9/2018  3:04 PM CDT)       Printout Tracking      External Result Report       Pharmacy      [facility] [id number] [place] [address]            Medication refused.    Darcie Patrick RN,   [facility]

## 2018-11-26 DIAGNOSIS — F41.8 DEPRESSION WITH ANXIETY: ICD-10-CM

## 2018-11-26 NOTE — LETTER
December 4, 2018      Minerva Rodríguez  [address] [facility] [address]              Dear Minerva,      We recently received a refill request from your pharmacy for escitalopram (LEXAPRO) 10 MG tablet. A review of your chart indicates at your last office visit on 8/9/2018 Zoraida Leal CNP advised for a follow up with her in 1 months time. We have attempted to reach you by phone, and so has your pharmacy. At this time, we cannot fulfill the refill requests for the above medication until we hear from you.     Please call [phone] to speak with a nurse regarding this letter.     We hope this finds you well,      Sincerely,    ELAN Colon CNP

## 2018-11-26 NOTE — TELEPHONE ENCOUNTER
"**Patient not active on MyChart.    Attempted to reach patient by phone, but was \"busy tone\" x2      escitalopram (LEXAPRO) 10 MG tablet 30 tablet 1 8/9/2018  --   Sig - Route: Take 0.5 tablets (5 mg) by mouth daily - Oral     Last OV with LEE Leal, CNP: 8/9/18 Follow up in 1 month on My chart     No future apts.     PHQ-9 SCORE 6/28/2018 7/6/2018 8/9/2018   Total Score 25 20 7     BHARAT-7 SCORE 6/28/2018 7/6/2018 8/9/2018   Total Score 7 9 11       SSRIs Protocol Epoeeo63/26 2:00 AM   Recent (12 mo) or future (30 days) visit within the authorizing provider's specialty    Patient is age 18 or older    No active pregnancy on record    No positive pregnancy test in last 12 months     Nina Brewer RN    "

## 2018-12-03 NOTE — TELEPHONE ENCOUNTER
Attempt #3.    Called patient, no voicemail set up.  No answer.  Called [facility],they had not heard from us after 3 attempts as well, they also sent a denial letter out to the patient and asked patient to reach out to the clinic for approval.    They had her number as [phone]    Called this number and it is not in service.    Routing to Holly Leal NP to review.    No future appt.    Mariluz Manley RN, [facility]

## 2018-12-04 RX ORDER — ESCITALOPRAM OXALATE 10 MG/1
TABLET ORAL
Qty: 30 TABLET | Refills: 1 | OUTPATIENT
Start: 2018-12-04

## 2019-01-29 DIAGNOSIS — F41.8 DEPRESSION WITH ANXIETY: ICD-10-CM

## 2019-01-30 RX ORDER — BUPROPION HYDROCHLORIDE 300 MG/1
TABLET ORAL
Qty: 90 TABLET | Refills: 1 | OUTPATIENT
Start: 2019-01-30

## 2019-04-29 ENCOUNTER — OFFICE VISIT (OUTPATIENT)
Dept: PEDIATRICS | Facility: CLINIC | Age: 54
End: 2019-04-29
Payer: COMMERCIAL

## 2019-04-29 VITALS
WEIGHT: 124.5 LBS | DIASTOLIC BLOOD PRESSURE: 70 MMHG | TEMPERATURE: 98 F | HEART RATE: 77 BPM | HEIGHT: 64 IN | BODY MASS INDEX: 21.25 KG/M2 | SYSTOLIC BLOOD PRESSURE: 120 MMHG | OXYGEN SATURATION: 100 %

## 2019-04-29 DIAGNOSIS — L71.9 ROSACEA: ICD-10-CM

## 2019-04-29 DIAGNOSIS — F41.8 DEPRESSION WITH ANXIETY: Primary | ICD-10-CM

## 2019-04-29 DIAGNOSIS — H61.23 BILATERAL IMPACTED CERUMEN: ICD-10-CM

## 2019-04-29 PROCEDURE — 99213 OFFICE O/P EST LOW 20 MIN: CPT | Performed by: NURSE PRACTITIONER

## 2019-04-29 RX ORDER — BUPROPION HYDROCHLORIDE 300 MG/1
300 TABLET ORAL EVERY MORNING
Qty: 90 TABLET | Refills: 3 | Status: SHIPPED | OUTPATIENT
Start: 2019-04-29 | End: 2020-06-12

## 2019-04-29 RX ORDER — DOXYCYCLINE 100 MG/1
CAPSULE ORAL
Qty: 90 CAPSULE | Refills: 1 | Status: SHIPPED | OUTPATIENT
Start: 2019-04-29 | End: 2020-03-16

## 2019-04-29 ASSESSMENT — ANXIETY QUESTIONNAIRES
2. NOT BEING ABLE TO STOP OR CONTROL WORRYING: NOT AT ALL
7. FEELING AFRAID AS IF SOMETHING AWFUL MIGHT HAPPEN: SEVERAL DAYS
GAD7 TOTAL SCORE: 1
6. BECOMING EASILY ANNOYED OR IRRITABLE: NOT AT ALL
3. WORRYING TOO MUCH ABOUT DIFFERENT THINGS: NOT AT ALL
5. BEING SO RESTLESS THAT IT IS HARD TO SIT STILL: NOT AT ALL
1. FEELING NERVOUS, ANXIOUS, OR ON EDGE: NOT AT ALL

## 2019-04-29 ASSESSMENT — PATIENT HEALTH QUESTIONNAIRE - PHQ9
SUM OF ALL RESPONSES TO PHQ QUESTIONS 1-9: 1
5. POOR APPETITE OR OVEREATING: NOT AT ALL

## 2019-04-29 ASSESSMENT — MIFFLIN-ST. JEOR: SCORE: 1154.73

## 2019-04-29 NOTE — PATIENT INSTRUCTIONS
PLAN:   1.   Symptomatic therapy suggested: Continue current medication regimen unchanged.  2.  Orders Placed This Encounter   Medications     buPROPion (WELLBUTRIN XL) 300 MG 24 hr tablet     Sig: Take 1 tablet (300 mg) by mouth every morning     Dispense:  90 tablet     Refill:  3     doxycycline hyclate (VIBRAMYCIN) 100 MG capsule     Sig: TAKE 1 CAPSULE BY MOUTH DAILY     Dispense:  90 capsule     Refill:  1     Orders Placed This Encounter   Procedures     REMOVE IMPACTED CERUMEN     DERMATOLOGY REFERRAL       3. Patient needs to follow up in if no improvement,or sooner if worsening of symptoms or other symptoms develop.  CONSULTATION/REFERRAL to Dermatology  Please call [phone] to make appointment  if you do not hear from referrals in the next few days.   Schedule physical exam in July

## 2019-04-29 NOTE — NURSING NOTE
"Chief Complaint   Patient presents with     Depression     Anxiety       Initial /70 (BP Location: Right arm, Patient Position: Sitting, Cuff Size: Adult Regular)   Pulse 77   Temp 98  F (36.7  C) (Temporal)   Ht 1.626 m (5' 4\")   Wt 56.5 kg (124 lb 8 oz)   SpO2 100%   Breastfeeding? No   BMI 21.37 kg/m   Estimated body mass index is 21.37 kg/m  as calculated from the following:    Height as of this encounter: 1.626 m (5' 4\").    Weight as of this encounter: 56.5 kg (124 lb 8 oz).  Medication Reconciliation: complete      ANASTASIA Grant      "

## 2019-04-29 NOTE — PROGRESS NOTES
SUBJECTIVE:   Minerva Rodríguez is a 53 year old female who presents to clinic today for the following   health issues:    Depression and Anxiety Follow-Up    Status since last visit: Improved     Other associated symptoms:None    Complicating factors:     Significant life event: No     Current substance abuse: None    PHQ 7/6/2018 8/9/2018 4/29/2019   PHQ-9 Total Score 20 7 1   Q9: Thoughts of better off dead/self-harm past 2 weeks Several days Not at all Not at all     BHARAT-7 SCORE 7/6/2018 8/9/2018 4/29/2019   Total Score 9 11 1     In the past two weeks have you had thoughts of suicide or self-harm?  No.    Do you have concerns about your personal safety or the safety of others?   No  PHQ-9  English  PHQ-9   Any Language  BHARAT-7  Suicide Assessment Five-step Evaluation and Treatment (SAFE-T)    Amount of exercise or physical activity: active at work    Problems taking medications regularly: No    Medication side effects: none    Diet: regular (no restrictions)      PROBLEMS TO ADD ON...  Rosacea responds well to the doxycycline   Has tried topical agents with minimal success.   Discussed trying to decrease dosing frequency and see if can maintain at a lesser dose   Will get consult with derm and see what other options are available as previous topical was not successful for her and face is clear on the doxycycline     Additional history: as documented    Reviewed  and updated as needed this visit by clinical staff         Reviewed and updated as needed this visit by Provider         Patient Active Problem List   Diagnosis     Social anxiety disorder     Alcohol dependence (H)     Hypertension     Major depression in remission (H)     Cervical cancer screening     Rosacea     Persistent depressive disorder     Past Surgical History:   Procedure Laterality Date     COLONOSCOPY WITH CO2 INSUFFLATION N/A 4/14/2017    Procedure: COLONOSCOPY WITH CO2 INSUFFLATION;  Surgeon: Duane, William Charles, MD;  Location:  OR      "NO HISTORY OF SURGERY         Social History     Tobacco Use     Smoking status: Never Smoker     Smokeless tobacco: Never Used   Substance Use Topics     Alcohol use: Yes     Alcohol/week: 25.2 oz     Types: 42 Cans of beer per week     Family History   Problem Relation Age of Onset     Hypertension Father      Depression Father      Substance Abuse Father      Depression Brother      Cancer Maternal Aunt      Other Cancer Maternal Aunt      Diabetes No family hx of      Coronary Artery Disease No family hx of      Hyperlipidemia No family hx of      Cerebrovascular Disease No family hx of      Breast Cancer No family hx of      Colon Cancer No family hx of      Asthma No family hx of      Thyroid Disease No family hx of          Current Outpatient Medications   Medication Sig Dispense Refill     buPROPion (WELLBUTRIN XL) 300 MG 24 hr tablet Take 1 tablet (300 mg) by mouth every morning 90 tablet 1     doxycycline (VIBRAMYCIN) 100 MG capsule TAKE 1 CAPSULE BY MOUTH DAILY 90 capsule 1     escitalopram (LEXAPRO) 10 MG tablet Take 0.5 tablets (5 mg) by mouth daily (Patient not taking: Reported on 4/29/2019) 30 tablet 1     metroNIDAZOLE (METROGEL) 0.75 % topical gel Apply topically 2 times daily (Patient not taking: Reported on 4/29/2019) 60 g 3     Allergies   Allergen Reactions     Other [Seasonal Allergies] Rash and Blisters     Hair color      BP Readings from Last 3 Encounters:   04/29/19 120/70   08/09/18 124/66   06/28/18 109/70    Wt Readings from Last 3 Encounters:   04/29/19 56.5 kg (124 lb 8 oz)   08/09/18 55.1 kg (121 lb 8 oz)   06/28/18 56.2 kg (123 lb 14.4 oz)                  Labs reviewed in EPIC    ROS:  Constitutional, HEENT, cardiovascular, pulmonary, gi and gu systems are negative, except as otherwise noted.    OBJECTIVE:     /70 (BP Location: Right arm, Patient Position: Sitting, Cuff Size: Adult Regular)   Pulse 77   Temp 98  F (36.7  C) (Temporal)   Ht 1.626 m (5' 4\")   Wt 56.5 kg (124 " lb 8 oz)   SpO2 100%   Breastfeeding? No   BMI 21.37 kg/m    Body mass index is 21.37 kg/m .  GENERAL APPEARANCE: alert, active and no distress  bilateral ear(s) are occluded with cerumen.  The canal are irrigated with good results, and thereafter are clear, with the exception of slight erythema and edema.  The tympanic membrane(s) are benign in appeareance.    Procedure: The ears were cleaned using an otoscope, curette and irrigation to clear the cerumen.  The patient  was instructed to follow up as needed or sooner  if symptoms worsen or fail to improve as anticipated.    Instructions for home care to prevent wax buildup are given.    RESP: lungs clear to auscultation - no rales, rhonchi or wheezes  CV: regular rates and rhythm  MS: extremities normal- no gross deformities noted  SKIN: negatives: color normal, temperature normal, mobility and turgor normal, positives:   There are erythematous papules and scattered telangectasias on the bilateral cheeks and nose.   NEURO: Normal strength and tone, mentation intact and speech normal  PSYCH: mentation appears normal and affect normal/bright  MENTAL STATUS EXAM:  Appearance/Behavior: No apparent distress, Casually groomed and Dressed appropriately for weather  Speech: Normal  Mood/Affect: normal affect  Insight: Adequate    Diagnostic Test Results:  none     ASSESSMENT/PLAN:     Minerva was seen today for depression and anxiety.    Diagnoses and all orders for this visit:    Depression with anxiety  -     buPROPion (WELLBUTRIN XL) 300 MG 24 hr tablet; Take 1 tablet (300 mg) by mouth every morning    Rosacea  -     doxycycline hyclate (VIBRAMYCIN) 100 MG capsule; TAKE 1 CAPSULE BY MOUTH DAILY  -     DERMATOLOGY REFERRAL    Bilateral impacted cerumen  -     REMOVE IMPACTED CERUMEN; Standing  -     REMOVE IMPACTED CERUMEN        See Patient Instructions  Patient Instructions     PLAN:   1.   Symptomatic therapy suggested: Continue current medication regimen  unchanged.  2.  Orders Placed This Encounter   Medications     buPROPion (WELLBUTRIN XL) 300 MG 24 hr tablet     Sig: Take 1 tablet (300 mg) by mouth every morning     Dispense:  90 tablet     Refill:  3     doxycycline hyclate (VIBRAMYCIN) 100 MG capsule     Sig: TAKE 1 CAPSULE BY MOUTH DAILY     Dispense:  90 capsule     Refill:  1     Orders Placed This Encounter   Procedures     REMOVE IMPACTED CERUMEN     DERMATOLOGY REFERRAL       3. Patient needs to follow up in if no improvement,or sooner if worsening of symptoms or other symptoms develop.  CONSULTATION/REFERRAL to Dermatology  Please call [phone] to make appointment  if you do not hear from referrals in the next few days.   Schedule physical exam in July     ANGI Simon CNP  [facility]

## 2019-04-30 ASSESSMENT — ANXIETY QUESTIONNAIRES: GAD7 TOTAL SCORE: 1

## 2020-02-24 ENCOUNTER — HEALTH MAINTENANCE LETTER (OUTPATIENT)
Age: 55
End: 2020-02-24

## 2020-03-14 DIAGNOSIS — L71.9 ROSACEA: ICD-10-CM

## 2020-03-14 NOTE — LETTER
March 17, 2020      Minerva Rodríguez  [address] [id number]      Dear Minerva,    We recently received a call from your pharmacy requesting a refill of your medication.    A review of your chart indicates that an appointment is due with your provider.  Please call the clinic to schedule your appointment.    We have authorized the refill of your medication. If you have a history of diabetes or high cholesterol, please come in fasting for the appointment. Fasting entails nothing to eat or drink 8 hours prior to your appointment; with the exception on water. You may take your medication the day of the appointment.    Thank you,      Zoraida Leal RN, CNP

## 2020-03-16 RX ORDER — DOXYCYCLINE 100 MG/1
CAPSULE ORAL
Qty: 90 CAPSULE | Refills: 0 | Status: SHIPPED | OUTPATIENT
Start: 2020-03-16 | End: 2020-08-31

## 2020-03-16 NOTE — TELEPHONE ENCOUNTER
Doxycycline  .Last Written Prescription Date:  44/29/2019  Last Fill Quantity: 90,  # refills: 0   Last office visit: 4/29/2019 with prescribing provider:  Holly Leal   Future Office Visit:      Routing refill request to provider for review/approval because:  Drug not on the [facility] refill protocol

## 2020-04-02 ENCOUNTER — VIRTUAL VISIT (OUTPATIENT)
Dept: PEDIATRICS | Facility: CLINIC | Age: 55
End: 2020-04-02
Payer: COMMERCIAL

## 2020-04-02 ENCOUNTER — E-VISIT (OUTPATIENT)
Dept: PEDIATRICS | Facility: CLINIC | Age: 55
End: 2020-04-02

## 2020-04-02 DIAGNOSIS — F41.8 SITUATIONAL ANXIETY: Primary | ICD-10-CM

## 2020-04-02 DIAGNOSIS — F41.8 DEPRESSION WITH ANXIETY: Primary | ICD-10-CM

## 2020-04-02 DIAGNOSIS — R03.0 ELEVATED BP WITHOUT DIAGNOSIS OF HYPERTENSION: ICD-10-CM

## 2020-04-02 DIAGNOSIS — F41.8 DEPRESSION WITH ANXIETY: ICD-10-CM

## 2020-04-02 PROCEDURE — 99214 OFFICE O/P EST MOD 30 MIN: CPT | Mod: TEL | Performed by: NURSE PRACTITIONER

## 2020-04-02 PROCEDURE — 99207 ZZC NON-BILLABLE SERV PER CHARTING: CPT | Performed by: NURSE PRACTITIONER

## 2020-04-02 RX ORDER — HYDROXYZINE HYDROCHLORIDE 25 MG/1
25-50 TABLET, FILM COATED ORAL EVERY 8 HOURS PRN
Qty: 30 TABLET | Refills: 1 | Status: SHIPPED | OUTPATIENT
Start: 2020-04-02 | End: 2020-04-27

## 2020-04-02 NOTE — TELEPHONE ENCOUNTER
Provider E-Visit time total (minutes): 0    Can we make this a phone visit instead?   Too vague of complaint would work better.

## 2020-04-02 NOTE — PATIENT INSTRUCTIONS
PLAN:   1.   Symptomatic therapy suggested: use hydroxyzine as needed for anxiety attacks  Will start on half tablet a day of sertraline and increase to whole tablet a day after 2 weeks.  2.  Orders Placed This Encounter   Medications     hydrOXYzine (ATARAX) 25 MG tablet     Sig: Take 1-2 tablets (25-50 mg) by mouth every 8 hours as needed for anxiety     Dispense:  30 tablet     Refill:  1     sertraline (ZOLOFT) 50 MG tablet     Sig: Take 1 tablet (50 mg) by mouth daily     Dispense:  30 tablet     Refill:  1     3. Patient needs to follow up in if no improvement,or sooner if worsening of symptoms or other symptoms develop.  Blood pressure checks about once a day  Follow up in 1 month via My chart

## 2020-04-02 NOTE — PROGRESS NOTES
"Subjective     Minerva Rodríguez is a 54 year old female who is being evaluated via a billable telephone visit.      The patient has been notified of following:     \"This telephone visit will be conducted via a call between you and your physician/provider. We have found that certain health care needs can be provided without the need for a physical exam.  This service lets us provide the care you need with a short phone conversation.  If a prescription is necessary we can send it directly to your pharmacy.  If lab work is needed we can place an order for that and you can then stop by our lab to have the test done at a later time.    If during the course of the call the physician/provider feels a telephone visit is not appropriate, you will not be charged for this service.\"     Patient has given verbal consent for Telephone visit?  Yes    Minerva Rodríguez complains of   Chief Complaint   Patient presents with     Hypertension       ALLERGIES  Other [seasonal allergies]    Hypertension Follow-up      Do you check your blood pressure regularly outside of the clinic? Yes     Are you following a low salt diet? Yes    Are your blood pressures ever more than 140 on the top number (systolic) OR more   than 90 on the bottom number (diastolic), for example 140/90? Yes 155/95 today when she checked it. [place] off, and was shaking.      How many servings of fruits and vegetables do you eat daily?  2-3    On average, how many sweetened beverages do you drink each day (Examples: soda, juice, sweet tea, etc.  Do NOT count diet or artificially sweetened beverages)?   0    How many days per week do you exercise enough to make your heart beat faster? 3 or less    How many minutes a day do you exercise enough to make your heart beat faster? 9 or less    How many days per week do you miss taking your medication? 0        Has been under stress and been feeling off so went to get her blood pressure checked  Can not calm self down  Is sleeping OK and " waking up rested.   BP was 155/95 at [facility]   No chest pain or pressure   Is having a lot of anxiety as well with all the stress   Diet and weight are the same   Noticed at the dentist was shaking     Patient Active Problem List   Diagnosis     Social anxiety disorder     Alcohol dependence (H)     Hypertension     Major depression in remission (H)     Cervical cancer screening     Rosacea     Persistent depressive disorder     Past Surgical History:   Procedure Laterality Date     COLONOSCOPY WITH CO2 INSUFFLATION N/A 4/14/2017    Procedure: COLONOSCOPY WITH CO2 INSUFFLATION;  Surgeon: Duane, William Charles, MD;  Location: [facility] OR     NO HISTORY OF SURGERY         Social History     Tobacco Use     Smoking status: Never Smoker     Smokeless tobacco: Never Used   Substance Use Topics     Alcohol use: Yes     Alcohol/week: 42.0 standard drinks     Types: 42 Cans of beer per week     Family History   Problem Relation Age of Onset     Hypertension Father      Depression Father      Substance Abuse Father      Depression Brother      Cancer Maternal Aunt      Other Cancer Maternal Aunt      Diabetes No family hx of      Coronary Artery Disease No family hx of      Hyperlipidemia No family hx of      Cerebrovascular Disease No family hx of      Breast Cancer No family hx of      Colon Cancer No family hx of      Asthma No family hx of      Thyroid Disease No family hx of          Current Outpatient Medications   Medication Sig Dispense Refill     buPROPion (WELLBUTRIN XL) 300 MG 24 hr tablet Take 1 tablet (300 mg) by mouth every morning 90 tablet 3     doxycycline hyclate (VIBRAMYCIN) 100 MG capsule TAKE 1 CAPSULE BY MOUTH EVERY DAY 90 capsule 0     escitalopram (LEXAPRO) 10 MG tablet Take 0.5 tablets (5 mg) by mouth daily (Patient not taking: Reported on 4/29/2019) 30 tablet 1     metroNIDAZOLE (METROGEL) 0.75 % topical gel Apply topically 2 times daily (Patient not taking: Reported on 4/29/2019) 60 g 3     Allergies    Allergen Reactions     Other [Seasonal Allergies] Rash and Blisters     Hair color      BP Readings from Last 3 Encounters:   04/29/19 120/70   08/09/18 124/66   06/28/18 109/70    Wt Readings from Last 3 Encounters:   04/29/19 56.5 kg (124 lb 8 oz)   08/09/18 55.1 kg (121 lb 8 oz)   06/28/18 56.2 kg (123 lb 14.4 oz)              Reviewed and updated as needed this visit by Provider         Review of Systems   ROS COMP: Constitutional, HEENT, cardiovascular, pulmonary, gi and gu systems are negative, except as otherwise noted.       Objective   Reported vitals:  Breastfeeding No    alert, active and no distress  Psych: Alert and oriented times 3; coherent speech, normal   rate and volume, able to articulate logical thoughts, able   to abstract reason, no tangential thoughts, no hallucinations   or delusions  Her affect is normal      Diagnostic Test Results:  Labs reviewed in Epic  none         Assessment/Plan:  Minerva was seen today for hypertension.    Diagnoses and all orders for this visit:    Situational anxiety  -     hydrOXYzine (ATARAX) 25 MG tablet; Take 1-2 tablets (25-50 mg) by mouth every 8 hours as needed for anxiety  Discussed the pathophysiology of anxiety episodes and the various symptoms seen associated with anxiety episodes.  Discussed possible triggers including fatigue, depression, stress, and chemicals such as alcohol, caffeine and certain drugs.  Discussed the treatment including an aerobic exercise program, adequate rest, and both rescue meds and maintenance meds.    Depression with anxiety  -     sertraline (ZOLOFT) 50 MG tablet; Take 1 tablet (50 mg) by mouth daily  Initiate medication with Zoloft  50 mg q day  Reviewed concept of depression as function of biochemical imbalance of neurotransmitters/rationale for treatment.  Risks and benefits of medication(s) reviewed with patient.  Questions answered.  Counseling advised  Followup appointment in 1 month(s)  Patient instructed to call for  significant side effects medications or problems  Patient advised immediate presentation to hospital for suicidal thought, etc.      Elevated BP without diagnosis of hypertension  Discussed sodium restriction, maintaining ideal body weight and regular exercise program as physiologic means to achieve blood pressure control.  The pt will strive towards this.    The pt indicates understanding of these issues and agrees with the plan.    Continue home readings and return in 1-2 months.  Discussed long term complications of untreated htn.      PLAN:   Patient Instructions     PLAN:   1.   Symptomatic therapy suggested: use hydroxyzine as needed for anxiety attacks  Will start on half tablet a day of sertraline and increase to whole tablet a day after 2 weeks.  2.  Orders Placed This Encounter   Medications     hydrOXYzine (ATARAX) 25 MG tablet     Sig: Take 1-2 tablets (25-50 mg) by mouth every 8 hours as needed for anxiety     Dispense:  30 tablet     Refill:  1     sertraline (ZOLOFT) 50 MG tablet     Sig: Take 1 tablet (50 mg) by mouth daily     Dispense:  30 tablet     Refill:  1     3. Patient needs to follow up in if no improvement,or sooner if worsening of symptoms or other symptoms develop.  Blood pressure checks about once a day  Follow up in 1 month via My chart       No follow-ups on file.      Phone call duration:  22 minutes    ANGI Simon CNP

## 2020-04-02 NOTE — TELEPHONE ENCOUNTER
Called pt LVM for pt to call back to schedule telephone visit. Leesa PALMA CMA   Dr. Campoverde Dr. Durbin Dr. Fay Dr. LALITA Durbin Dr. Seay ID consult IV Team

## 2020-04-25 DIAGNOSIS — F41.8 SITUATIONAL ANXIETY: ICD-10-CM

## 2020-04-25 DIAGNOSIS — F41.8 DEPRESSION WITH ANXIETY: ICD-10-CM

## 2020-04-27 RX ORDER — HYDROXYZINE HYDROCHLORIDE 25 MG/1
25-50 TABLET, FILM COATED ORAL EVERY 8 HOURS PRN
Qty: 30 TABLET | Refills: 1 | Status: SHIPPED | OUTPATIENT
Start: 2020-04-27 | End: 2021-05-14

## 2020-04-27 NOTE — TELEPHONE ENCOUNTER
"Requested Prescriptions   Pending Prescriptions Disp Refills     sertraline (ZOLOFT) 50 MG tablet [Pharmacy Med Name: SERTRALINE HCL 50 MG TABLET] 30 tablet 1     Sig: TAKE 1 TABLET BY MOUTH EVERY DAY       SSRIs Protocol Failed - 4/25/2020  8:33 AM        Failed - PHQ-9 score less than 5 in past 6 months     Please review last PHQ-9 score.           Passed - Medication is active on med list        Passed - Patient is age 18 or older        Passed - No active pregnancy on record        Passed - No positive pregnancy test in last 12 months        Passed - Recent (6 mo) or future (30 days) visit within the authorizing provider's specialty     Patient had office visit in the last 6 months or has a visit in the next 30 days with authorizing provider or within the authorizing provider's specialty.  See \"Patient Info\" tab in inbasket, or \"Choose Columns\" in Meds & Orders section of the refill encounter.               hydrOXYzine (ATARAX) 25 MG tablet [Pharmacy Med Name: HYDROXYZINE HCL 25 MG TABLET] 30 tablet 1     Sig: TAKE 1-2 TABLETS (25-50 MG) BY MOUTH EVERY 8 HOURS AS NEEDED FOR ANXIETY       Antihistamines Protocol Passed - 4/25/2020  8:33 AM        Passed - Recent (12 mo) or future (30 days) visit within the authorizing provider's specialty     Patient has had an office visit with the authorizing provider or a provider within the authorizing providers department within the previous 12 mos or has a future within next 30 days. See \"Patient Info\" tab in inbasket, or \"Choose Columns\" in Meds & Orders section of the refill encounter.              Passed - Patient is age 3 or older     Apply age and/or weight-based dosing for peds patients age 3 and older.    Forward request to provider for patients under the age of 3.          Passed - Medication is active on med list           Routing refill request to provider for review/approval because:  PHQ-9 score:    PHQ 4/29/2019   PHQ-9 Total Score 1   Q9: Thoughts of better " off dead/self-harm past 2 weeks Not at all     Does not meet criteria to fill per protocol. Patient had virtual visit on 4/2/20.    Roxie Ardon, RN, BSN, PHN  [facility]

## 2020-06-10 DIAGNOSIS — F41.8 DEPRESSION WITH ANXIETY: ICD-10-CM

## 2020-06-12 RX ORDER — BUPROPION HYDROCHLORIDE 300 MG/1
300 TABLET ORAL EVERY MORNING
Qty: 90 TABLET | Refills: 0 | Status: SHIPPED | OUTPATIENT
Start: 2020-06-12 | End: 2020-11-19

## 2020-06-12 NOTE — TELEPHONE ENCOUNTER
buPROPion (WELLBUTRIN XL) 300 MG 24 hr tablet  Last Written Prescription Date:  4/29/19  Last Fill Quantity: 90,   # refills: 3  Last Office Visit : 4/2/20  Future Office visit:  none      90 day to pharmacy    Routing refill request to provider for review/approval because: phq9  Past due

## 2020-08-25 DIAGNOSIS — L71.9 ROSACEA: ICD-10-CM

## 2020-08-31 RX ORDER — DOXYCYCLINE 100 MG/1
100 CAPSULE ORAL DAILY
Qty: 90 CAPSULE | Refills: 1 | Status: SHIPPED | OUTPATIENT
Start: 2020-08-31 | End: 2021-04-01

## 2020-08-31 NOTE — TELEPHONE ENCOUNTER
DOXYCYCLINE HYCLATE 100 MG CAP      Last Written Prescription Date:  3/16/2020  Last Fill Quantity: 90,   # refills: 0  Last Office Visit : 4/2/2020  Future Office visit:  None    Routing refill request to provider for review/approval because:  Drug not on the [facility] refill protocol or controlled substance      Zenia Isabel RN  Central Triage Red Flags/Med Refills

## 2020-11-15 DIAGNOSIS — F41.8 DEPRESSION WITH ANXIETY: ICD-10-CM

## 2020-11-15 NOTE — LETTER
December 8, 2020    Minerva Rodríguez  [address] [address] [facility] [address] [id number]        Dear Minerva Rodríguez,    The refill request made by your pharmacy was received at the clinic. At this time you are due for a follow up video or phone appointment with Holly Leal CNP. A one time josé refill has been sent to your pharmacy.     We have been unable to reach you by phone or [employer]. Please call your clinic or use [facility] to make an appointment with your provider before you run out of medication. This will prevent a delay in your next refill. Please let us know if you have any questions and we would be happy to help.     Thank you for trusting us with your care.    Sincerely,     [facility] [place] [facility]  [phone]

## 2020-11-19 RX ORDER — BUPROPION HYDROCHLORIDE 300 MG/1
300 TABLET ORAL EVERY MORNING
Qty: 90 TABLET | Refills: 0 | Status: SHIPPED | OUTPATIENT
Start: 2020-11-19 | End: 2021-05-14

## 2020-11-19 NOTE — TELEPHONE ENCOUNTER
buPROPion (WELLBUTRIN XL) 300 MG 24 hr tablet    Take 1 tablet (300 mg) by mouth every morning    Last Written Prescription Date:  6/12/20  Last Fill Quantity: 90,   # refills: 0  Last Office Visit : 4/2/20  Followup appointment in 1 month(s)  Future Office visit:  none    Routing refill request to provider for review/approval because:  Overdue visit and PHQ9 .

## 2020-11-19 NOTE — TELEPHONE ENCOUNTER
Attempt # 1    Left message for patient stating prescription has been sent to the pharmacy. Advised patient to call clinic to schedule a virtual  appointment with Holly Leal CNP for followup. Clinic number and Mychart option given.    Shania Jo  Primary Care   [facility] [place]

## 2020-12-01 NOTE — TELEPHONE ENCOUNTER
2nd attempt, unable to reach or leave voicemail.  Sending [employer] message.    Shania Jo  Primary Care   [facility] [place]

## 2020-12-08 NOTE — TELEPHONE ENCOUNTER
Mychart message unread, chart letter created and sent.    Shania Jo  Primary Care   [facility] [place]

## 2020-12-13 ENCOUNTER — HEALTH MAINTENANCE LETTER (OUTPATIENT)
Age: 55
End: 2020-12-13

## 2021-03-16 ENCOUNTER — MYC MEDICAL ADVICE (OUTPATIENT)
Dept: FAMILY MEDICINE | Facility: CLINIC | Age: 56
End: 2021-03-16

## 2021-04-01 DIAGNOSIS — L71.9 ROSACEA: ICD-10-CM

## 2021-04-01 RX ORDER — DOXYCYCLINE 100 MG/1
CAPSULE ORAL
Qty: 90 CAPSULE | Refills: 1 | Status: SHIPPED | OUTPATIENT
Start: 2021-04-01 | End: 2021-05-14

## 2021-04-01 NOTE — TELEPHONE ENCOUNTER
Routing refill request to provider for review/approval because:  Drug not on the [facility] refill protocol     Monica GUTIERREZN, RN

## 2021-04-17 ENCOUNTER — HEALTH MAINTENANCE LETTER (OUTPATIENT)
Age: 56
End: 2021-04-17

## 2021-05-13 ASSESSMENT — PATIENT HEALTH QUESTIONNAIRE - PHQ9
10. IF YOU CHECKED OFF ANY PROBLEMS, HOW DIFFICULT HAVE THESE PROBLEMS MADE IT FOR YOU TO DO YOUR WORK, TAKE CARE OF THINGS AT HOME, OR GET ALONG WITH OTHER PEOPLE: SOMEWHAT DIFFICULT
SUM OF ALL RESPONSES TO PHQ QUESTIONS 1-9: 8
SUM OF ALL RESPONSES TO PHQ QUESTIONS 1-9: 8

## 2021-05-13 ASSESSMENT — ENCOUNTER SYMPTOMS
NAUSEA: 0
HEARTBURN: 0
FREQUENCY: 0
EYE PAIN: 0
DYSURIA: 0
ARTHRALGIAS: 0
SHORTNESS OF BREATH: 0
BREAST MASS: 0
COUGH: 0
DIARRHEA: 0
CONSTIPATION: 1
ABDOMINAL PAIN: 0
WEAKNESS: 0
PARESTHESIAS: 1
MYALGIAS: 0
NERVOUS/ANXIOUS: 1
CHILLS: 0
HEMATURIA: 0
PALPITATIONS: 0
SORE THROAT: 0
DIZZINESS: 0
FEVER: 0
HEADACHES: 0
JOINT SWELLING: 0
HEMATOCHEZIA: 0

## 2021-05-14 ENCOUNTER — OFFICE VISIT (OUTPATIENT)
Dept: FAMILY MEDICINE | Facility: CLINIC | Age: 56
End: 2021-05-14
Payer: COMMERCIAL

## 2021-05-14 VITALS
SYSTOLIC BLOOD PRESSURE: 144 MMHG | BODY MASS INDEX: 22.02 KG/M2 | TEMPERATURE: 98.5 F | HEART RATE: 86 BPM | OXYGEN SATURATION: 97 % | DIASTOLIC BLOOD PRESSURE: 84 MMHG | RESPIRATION RATE: 18 BRPM | WEIGHT: 129 LBS | HEIGHT: 64 IN

## 2021-05-14 DIAGNOSIS — Z12.31 ENCOUNTER FOR SCREENING MAMMOGRAM FOR BREAST CANCER: ICD-10-CM

## 2021-05-14 DIAGNOSIS — Z13.6 CARDIOVASCULAR SCREENING; LDL GOAL LESS THAN 130: ICD-10-CM

## 2021-05-14 DIAGNOSIS — Z00.00 ROUTINE GENERAL MEDICAL EXAMINATION AT A HEALTH CARE FACILITY: Primary | ICD-10-CM

## 2021-05-14 DIAGNOSIS — Z13.1 SCREENING FOR DIABETES MELLITUS (DM): ICD-10-CM

## 2021-05-14 DIAGNOSIS — F41.8 DEPRESSION WITH ANXIETY: ICD-10-CM

## 2021-05-14 DIAGNOSIS — E87.1 HYPONATREMIA: ICD-10-CM

## 2021-05-14 DIAGNOSIS — Z13.29 SCREENING FOR THYROID DISORDER: ICD-10-CM

## 2021-05-14 DIAGNOSIS — R03.0 ELEVATED BLOOD PRESSURE READING WITHOUT DIAGNOSIS OF HYPERTENSION: ICD-10-CM

## 2021-05-14 DIAGNOSIS — Z12.4 SCREENING FOR MALIGNANT NEOPLASM OF CERVIX: ICD-10-CM

## 2021-05-14 DIAGNOSIS — L71.9 ROSACEA: ICD-10-CM

## 2021-05-14 LAB
ALBUMIN SERPL-MCNC: 4.3 G/DL (ref 3.4–5)
ALP SERPL-CCNC: 62 U/L (ref 40–150)
ALT SERPL W P-5'-P-CCNC: 32 U/L (ref 0–50)
ANION GAP SERPL CALCULATED.3IONS-SCNC: 6 MMOL/L (ref 3–14)
AST SERPL W P-5'-P-CCNC: 22 U/L (ref 0–45)
BILIRUB SERPL-MCNC: 0.8 MG/DL (ref 0.2–1.3)
BUN SERPL-MCNC: 11 MG/DL (ref 7–30)
CALCIUM SERPL-MCNC: 9.2 MG/DL (ref 8.5–10.1)
CHLORIDE SERPL-SCNC: 96 MMOL/L (ref 94–109)
CHOLEST SERPL-MCNC: 293 MG/DL
CO2 SERPL-SCNC: 29 MMOL/L (ref 20–32)
CREAT SERPL-MCNC: 0.6 MG/DL (ref 0.52–1.04)
GFR SERPL CREATININE-BSD FRML MDRD: >90 ML/MIN/{1.73_M2}
GLUCOSE SERPL-MCNC: 93 MG/DL (ref 70–99)
HDLC SERPL-MCNC: 108 MG/DL
LDLC SERPL CALC-MCNC: 171 MG/DL
NONHDLC SERPL-MCNC: 185 MG/DL
POTASSIUM SERPL-SCNC: 4.4 MMOL/L (ref 3.4–5.3)
PROT SERPL-MCNC: 7.8 G/DL (ref 6.8–8.8)
SODIUM SERPL-SCNC: 131 MMOL/L (ref 133–144)
TRIGL SERPL-MCNC: 69 MG/DL
TSH SERPL DL<=0.005 MIU/L-ACNC: 0.95 MU/L (ref 0.4–4)

## 2021-05-14 PROCEDURE — 80061 LIPID PANEL: CPT | Performed by: NURSE PRACTITIONER

## 2021-05-14 PROCEDURE — 84443 ASSAY THYROID STIM HORMONE: CPT | Performed by: NURSE PRACTITIONER

## 2021-05-14 PROCEDURE — G0145 SCR C/V CYTO,THINLAYER,RESCR: HCPCS | Performed by: NURSE PRACTITIONER

## 2021-05-14 PROCEDURE — 87624 HPV HI-RISK TYP POOLED RSLT: CPT | Performed by: NURSE PRACTITIONER

## 2021-05-14 PROCEDURE — 99396 PREV VISIT EST AGE 40-64: CPT | Performed by: NURSE PRACTITIONER

## 2021-05-14 PROCEDURE — 36415 COLL VENOUS BLD VENIPUNCTURE: CPT | Performed by: NURSE PRACTITIONER

## 2021-05-14 PROCEDURE — 80053 COMPREHEN METABOLIC PANEL: CPT | Performed by: NURSE PRACTITIONER

## 2021-05-14 RX ORDER — BUPROPION HYDROCHLORIDE 300 MG/1
300 TABLET ORAL EVERY MORNING
Qty: 90 TABLET | Refills: 3 | Status: SHIPPED | OUTPATIENT
Start: 2021-05-14 | End: 2022-08-08

## 2021-05-14 RX ORDER — DOXYCYCLINE 100 MG/1
100 CAPSULE ORAL DAILY
Qty: 90 CAPSULE | Refills: 3 | Status: SHIPPED | OUTPATIENT
Start: 2021-05-14 | End: 2023-03-10

## 2021-05-14 ASSESSMENT — ENCOUNTER SYMPTOMS
NERVOUS/ANXIOUS: 1
NAUSEA: 0
PARESTHESIAS: 1
CONSTIPATION: 1
HEMATOCHEZIA: 0
SORE THROAT: 0
ARTHRALGIAS: 0
HEMATURIA: 0
FREQUENCY: 0
DYSURIA: 0
DIARRHEA: 0
FEVER: 0
COUGH: 0
MYALGIAS: 0
WEAKNESS: 0
SHORTNESS OF BREATH: 0
BREAST MASS: 0
ABDOMINAL PAIN: 0
CHILLS: 0
PALPITATIONS: 0
DIZZINESS: 0
JOINT SWELLING: 0
HEADACHES: 0
EYE PAIN: 0
HEARTBURN: 0

## 2021-05-14 ASSESSMENT — MIFFLIN-ST. JEOR: SCORE: 1156.17

## 2021-05-14 ASSESSMENT — PATIENT HEALTH QUESTIONNAIRE - PHQ9: 5. POOR APPETITE OR OVEREATING: NOT AT ALL

## 2021-05-14 ASSESSMENT — ANXIETY QUESTIONNAIRES
GAD7 TOTAL SCORE: 6
IF YOU CHECKED OFF ANY PROBLEMS ON THIS QUESTIONNAIRE, HOW DIFFICULT HAVE THESE PROBLEMS MADE IT FOR YOU TO DO YOUR WORK, TAKE CARE OF THINGS AT HOME, OR GET ALONG WITH OTHER PEOPLE: NOT DIFFICULT AT ALL
1. FEELING NERVOUS, ANXIOUS, OR ON EDGE: SEVERAL DAYS
6. BECOMING EASILY ANNOYED OR IRRITABLE: SEVERAL DAYS
2. NOT BEING ABLE TO STOP OR CONTROL WORRYING: SEVERAL DAYS
3. WORRYING TOO MUCH ABOUT DIFFERENT THINGS: SEVERAL DAYS
7. FEELING AFRAID AS IF SOMETHING AWFUL MIGHT HAPPEN: MORE THAN HALF THE DAYS
5. BEING SO RESTLESS THAT IT IS HARD TO SIT STILL: NOT AT ALL

## 2021-05-14 ASSESSMENT — PAIN SCALES - GENERAL: PAINLEVEL: NO PAIN (0)

## 2021-05-14 NOTE — PROGRESS NOTES
SUBJECTIVE:   CC: Minerva Rodríguez is an 56 year old woman who presents for preventive health visit.       Patient has been advised of split billing requirements and indicates understanding:   Healthy Habits:     Getting at least 3 servings of Calcium per day:  Yes    Bi-annual eye exam:  NO    Dental care twice a year:  NO    Sleep apnea or symptoms of sleep apnea:  None    Diet:  Regular (no restrictions)    Frequency of exercise:  2-3 days/week    Duration of exercise:  30-45 minutes    Taking medications regularly:  Yes    Medication side effects:  None    PHQ-2 Total Score: 3    Additional concerns today:  Yes      Today's PHQ-2 Score:   PHQ-2 ( 1999 [employer]) 5/13/2021   Q1: Little interest or pleasure in doing things 2   Q2: Feeling down, depressed or hopeless 1   PHQ-2 Score 3   Q1: Little interest or pleasure in doing things More than half the days   Q2: Feeling down, depressed or hopeless Several days   PHQ-2 Score 3       Abuse: Current or Past (Physical, Sexual or Emotional) - No  Do you feel safe in your environment? Yes    Have you ever done Advance Care Planning? (For example, a Health Directive, POLST, or a discussion with a medical provider or your loved ones about your wishes): No, advance care planning information given to patient to review.  Patient plans to discuss their wishes with loved ones or provider.      Social History     Tobacco Use     Smoking status: Never Smoker     Smokeless tobacco: Never Used   Substance Use Topics     Alcohol use: Yes     Alcohol/week: 42.0 standard drinks     Types: 42 Cans of beer per week     If you drink alcohol do you typically have >3 drinks per day or >7 drinks per week? No      AUDIT - Alcohol Use Disorders Identification Test - Reproduced from the World Health Organization Audit 2001 (Second Edition) 5/13/2021   1.  How often do you have a drink containing alcohol? 4 or more times a week   2.  How many drinks containing alcohol do you have on a typical  day when you are drinking? 5 or 6   3.  How often do you have five or more drinks on one occasion? Weekly   4.  How often during the last year have you found that you were not able to stop drinking once you had started? Monthly   5.  How often during the last year have you failed to do what was normally expected of you because of drinking? Less than monthly   6.  How often during the last year have you needed a first drink in the morning to get yourself going after a heavy drinking session? Never   7.  How often during the last year have you had a feeling of guilt or remorse after drinking? Less than monthly   8.  How often during the last year have you been unable to remember what happened the night before because of your drinking? Less than monthly   9.  Have you or someone else been injured because of your drinking? No   10. Has a relative, friend, doctor or other health care worker been concerned about your drinking or suggested you cut down? Yes, but not in the last year   TOTAL SCORE 16       Reviewed orders with patient.  Reviewed health maintenance and updated orders accordingly - Yes  Lab work is in process  Labs reviewed in EPIC  BP Readings from Last 3 Encounters:   05/14/21 (!) 144/84   04/29/19 120/70   08/09/18 124/66    Wt Readings from Last 3 Encounters:   05/14/21 58.5 kg (129 lb)   04/29/19 56.5 kg (124 lb 8 oz)   08/09/18 55.1 kg (121 lb 8 oz)               Patient Active Problem List   Diagnosis     Social anxiety disorder     Alcohol dependence (H)     Hypertension     Major depression in remission (H)     Cervical cancer screening     Rosacea     Persistent depressive disorder     Past Surgical History:   Procedure Laterality Date     COLONOSCOPY WITH CO2 INSUFFLATION N/A 4/14/2017    Procedure: COLONOSCOPY WITH CO2 INSUFFLATION;  Surgeon: Duane, William Charles, MD;  Location: [facility] OR     NO HISTORY OF SURGERY         Social History     Tobacco Use     Smoking status: Never Smoker     Smokeless  tobacco: Never Used   Substance Use Topics     Alcohol use: Yes     Alcohol/week: 42.0 standard drinks     Types: 42 Cans of beer per week     Family History   Problem Relation Age of Onset     Hypertension Father      Depression Father      Substance Abuse Father      Depression Brother      Cancer Maternal Aunt      Other Cancer Maternal Aunt      Diabetes No family hx of      Coronary Artery Disease No family hx of      Hyperlipidemia No family hx of      Cerebrovascular Disease No family hx of      Breast Cancer No family hx of      Colon Cancer No family hx of      Asthma No family hx of      Thyroid Disease No family hx of          Current Outpatient Medications   Medication Sig Dispense Refill     buPROPion (WELLBUTRIN XL) 300 MG 24 hr tablet Take 1 tablet (300 mg) by mouth every morning 90 tablet 0     doxycycline hyclate (VIBRAMYCIN) 100 MG capsule TAKE 1 CAPSULE BY MOUTH EVERY DAY 90 capsule 1     Allergies   Allergen Reactions     Other [Seasonal Allergies] Rash and Blisters     Hair color        Breast Cancer Screening:    Breast CA Risk Assessment (FHS-7) 5/13/2021   Do you have a family history of breast, colon, or ovarian cancer? No / Unknown     Mammogram Screening: Recommended mammography every 1-2 years with patient discussion and risk factor consideration  Pertinent mammograms are reviewed under the imaging tab.    History of abnormal Pap smear: NO - age 30- 65 PAP every 3 years recommended  PAP / HPV Latest Ref Rng & Units 5/14/2021 2/21/2017   PAP - NIL NIL   HPV 16 DNA NEG:Negative Negative Negative   HPV 18 DNA NEG:Negative Negative Negative   OTHER HR HPV NEG:Negative Negative Negative     Reviewed and updated as needed this visit by clinical staff                 Reviewed and updated as needed this visit by Provider                Past Medical History:   Diagnosis Date     Alcohol dependence (H)      Depression with anxiety 1/19/2016     Hypertension 2/20/2009     Major depression in  "remission (H) 2/20/2009      Past Surgical History:   Procedure Laterality Date     COLONOSCOPY WITH CO2 INSUFFLATION N/A 4/14/2017    Procedure: COLONOSCOPY WITH CO2 INSUFFLATION;  Surgeon: Duane, William Charles, MD;  Location:  OR     NO HISTORY OF SURGERY         Review of Systems   Constitutional: Negative for chills and fever.   HENT: Negative for congestion, ear pain, hearing loss and sore throat.    Eyes: Positive for visual disturbance. Negative for pain.        Has not had eyes examined several years    Respiratory: Negative for cough and shortness of breath.    Cardiovascular: Negative for chest pain, palpitations and peripheral edema.   Gastrointestinal: Positive for constipation. Negative for abdominal pain, diarrhea, heartburn, hematochezia and nausea.        Just recently noticed more constipation but is not as active    Breasts:  Negative for tenderness, breast mass and discharge.   Genitourinary: Negative for dysuria, frequency, genital sores, hematuria, pelvic pain, urgency, vaginal bleeding and vaginal discharge.   Musculoskeletal: Negative for arthralgias, joint swelling and myalgias.   Skin: Negative for rash.   Neurological: Positive for paresthesias. Negative for dizziness, weakness and headaches.        Has been going on for several years at least 5  almost every morning and will run under hot water and will feel better   They will turn if really cold and then will turn blue and then they normalize   Does not smoke and never has    Psychiatric/Behavioral: Negative for mood changes. The patient is nervous/anxious.         Has more anxiety this time of year. Will not really have a panic attacks  This is stressfulll as this is time of year where she does not know where she will be the next year        OBJECTIVE:   BP (!) 144/84 (BP Location: Right arm, Patient Position: Right side)   Pulse 86   Temp 98.5  F (36.9  C) (Oral)   Resp 18   Ht 1.619 m (5' 3.75\")   Wt 58.5 kg (129 lb)   SpO2 " 97%   BMI 22.32 kg/m     Repeat Blood Pressure:  BP Pulse Site Cuff Size Time Date   (!) 152/90 86 Right arm Adult Regular  9:10 AM 5/14/2021   (!) 153/94 --- Right arm Adult Regular  9:24 AM 5/14/2021   (!) 144/84 --- Right arm --- 10:09 AM 5/14/2021     Peak Flow Information  Peak Flow Resp Time Date   --- 18  9:10 AM 5/14/2021     Pain Information  Score Location Time Date   No Pain (0) ---  9:10 AM 5/14/2021   No orthostatic vitals data filed.    BP Readings from Last 6 Encounters:   05/14/21 (!) 144/84   04/29/19 120/70   08/09/18 124/66   06/28/18 109/70   03/26/18 150/88   04/14/17 101/70       Physical Exam  GENERAL APPEARANCE: alert and no distress  EYES: Eyes grossly normal to inspection and conjunctivae and sclerae normal  HENT: ear canals and TM's normal, nose and mouth without ulcers or lesions, oropharynx clear and oral mucous membranes moist  NECK: no adenopathy, no asymmetry, masses, or scars and thyroid normal to palpation  RESP: lungs clear to auscultation - no rales, rhonchi or wheezes  BREAST: normal without masses, tenderness or nipple discharge and no palpable axillary masses or adenopathy  CV: regular rates and rhythm, no murmur, click or rub, no peripheral edema and peripheral pulses strong  ABDOMEN: soft, nontender, no hepatosplenomegaly, no masses and bowel sounds normal   (female): normal female external genitalia, normal urethral meatus, vaginal mucosal atrophy noted, normal cervix, adnexae, and uterus without masses or abnormal discharge  MS: no musculoskeletal defects are noted and gait is age appropriate without ataxia  SKIN: no suspicious lesions or rashes  NEURO: Normal strength and tone, sensory exam grossly normal, mentation intact and speech normal  PSYCH: mentation appears normal and affect normal/bright    Diagnostic Test Results:  Labs reviewed in Epic  Results for orders placed or performed in visit on 05/14/21   Pap imaged thin layer screen with HPV - recommended age 30 -  65 years (select HPV order below)     Status: None   Result Value Ref Range    PAP NIL     Copath Report         Patient Name: FRANKI ENNIS  MR#: [id number]  Specimen #: [id number]  Collected: 5/14/2021  Received: 5/17/2021  Reported: 5/18/2021 08:45  Ordering Phy(s): KATHIA TURNER    For improved result formatting, select 'View Enhanced Report Format' under   Linked Documents section.    SPECIMEN/STAIN PROCESS:  Pap imaged thin layer prep screening (Surepath, FocalPoint with guided   screening)       Pap-Cyto x 1, HPV ordered x 1    SOURCE: Cervical, endocervical  ----------------------------------------------------------------   Pap imaged thin layer prep screening (Surepath, FocalPoint with guided   screening)  SPECIMEN ADEQUACY:  Satisfactory for evaluation.  -Transitional zone component could not be determined due to atrophy.    CYTOLOGIC INTERPRETATION:    Negative for intraepithelial lesion or malignancy    Electronically signed out by:  MARILEE Bobo (ASCP)    CLINICAL HISTORY:    Post Menopausal, A previous normal pap  Date of Last Pap: 2/21/2017,    Papanicolaou Test  Limitations:  Cervical cytology is a screening test with   limited sensitivity; regular  screening is critical for cancer prevention; Pap tests are primarily   effective for the diagnosis/prevention of  squamous cell carcinoma, not adenocarcinomas or other cancers.    COLLECTION SITE:  Client:  [facility]  Location: [facility])    The technical component of this testing was completed at the [facility], with the professional component performed   at the [facility], [address] [place] [phone] ([phone])       HPV High Risk Types DNA Cervical     Status: None   Result Value Ref Range    HPV Source SurePath     HPV 16 DNA Negative NEG^Negative    HPV 18 DNA  Negative NEG^Negative    Other HR HPV Negative NEG^Negative    Final Diagnosis This patient's sample is negative for HPV DNA.     Specimen Description Cervical Cells    Lipid panel reflex to direct LDL Fasting     Status: Abnormal   Result Value Ref Range    Cholesterol 293 (H) <200 mg/dL    Triglycerides 69 <150 mg/dL    HDL Cholesterol 108 >49 mg/dL    LDL Cholesterol Calculated 171 (H) <100 mg/dL    Non HDL Cholesterol 185 (H) <130 mg/dL   Comprehensive metabolic panel     Status: Abnormal   Result Value Ref Range    Sodium 131 (L) 133 - 144 mmol/L    Potassium 4.4 3.4 - 5.3 mmol/L    Chloride 96 94 - 109 mmol/L    Carbon Dioxide 29 20 - 32 mmol/L    Anion Gap 6 3 - 14 mmol/L    Glucose 93 70 - 99 mg/dL    Urea Nitrogen 11 7 - 30 mg/dL    Creatinine 0.60 0.52 - 1.04 mg/dL    GFR Estimate >90 >60 mL/min/[1.73_m2]    GFR Estimate If Black >90 >60 mL/min/[1.73_m2]    Calcium 9.2 8.5 - 10.1 mg/dL    Bilirubin Total 0.8 0.2 - 1.3 mg/dL    Albumin 4.3 3.4 - 5.0 g/dL    Protein Total 7.8 6.8 - 8.8 g/dL    Alkaline Phosphatase 62 40 - 150 U/L    ALT 32 0 - 50 U/L    AST 22 0 - 45 U/L   TSH with free T4 reflex     Status: None   Result Value Ref Range    TSH 0.95 0.40 - 4.00 mU/L       ASSESSMENT/PLAN:   Minerva was seen today for physical.    Diagnoses and all orders for this visit:    Routine general medical examination at a health care facility    Screening for malignant neoplasm of cervix  -     Pap imaged thin layer screen with HPV - recommended age 30 - 65 years (select HPV order below)  -     HPV High Risk Types DNA Cervical    CARDIOVASCULAR SCREENING; LDL GOAL LESS THAN 130  -     Lipid panel reflex to direct LDL Fasting  -     Lipid panel reflex to direct LDL Fasting; Future    Screening for thyroid disorder  -     TSH with free T4 reflex    Screening for diabetes mellitus (DM)  -     JUST IN CASE  -     Comprehensive metabolic panel    Encounter for screening mammogram for breast cancer  -     *MA Screening  Digital Bilateral; Future    Rosacea  -     doxycycline hyclate (VIBRAMYCIN) 100 MG capsule; Take 1 capsule (100 mg) by mouth daily  Continue current medications as prescribed.     Elevated blood pressure reading without diagnosis of hypertension  -     Home Blood Pressure Monitor Order for DME - ONLY FOR DME  Start monitoring BP periodically at home     Depression with anxiety  -     buPROPion (WELLBUTRIN XL) 300 MG 24 hr tablet; Take 1 tablet (300 mg) by mouth every morning  Continue current medications as prescribed.     Hyponatremia  -     Comprehensive metabolic panel; Future  Will follow up and/or notify patient of  results via My Chart to determine further need for followup      Other orders  -     REVIEW OF HEALTH MAINTENANCE PROTOCOL ORDERS    PLAN:   Patient needs to follow up in if no improvement,or sooner if worsening of symptoms or other symptoms develop.  FURTHER TESTING:       - mammogram  I will place order. Please call [phone] to schedule.  Start monitoring BP periodically at home   Will follow up and/or notify patient of  results via My Chart to determine further need for followup  Follow up office visit in one year for annual health maintenance exam, sooner PRN.      Patient has been advised of split billing requirements and indicates understanding: Yes  COUNSELING:  Reviewed preventive health counseling, as reflected in patient instructions  Special attention given to:        Regular exercise       Healthy diet/nutrition       Vision screening       Osteoporosis prevention/bone health       Colon cancer screening       Consider Hep C screening for all patients one time for ages 18-79 years       The ASCVD Risk score (Mima BARNEY Jr., et al., 2013) failed to calculate for the following reasons:    The valid HDL cholesterol range is 20 to 100 mg/dL       Advance Care Planning    Estimated body mass index is 22.32 kg/m  as calculated from the following:    Height as of this encounter: 1.619 m (5'  "3.75\").    Weight as of this encounter: 58.5 kg (129 lb).        She reports that she has never smoked. She has never used smokeless tobacco.      Counseling Resources:  ATP IV Guidelines  Pooled Cohorts Equation Calculator  Breast Cancer Risk Calculator  BRCA-Related Cancer Risk Assessment: FHS-7 Tool  FRAX Risk Assessment  ICSI Preventive Guidelines  Dietary Guidelines for Americans, 2010  Locate Special Diet's MyPlate  ASA Prophylaxis  Lung CA Screening    [facility]  Answers for HPI/ROS submitted by the patient on 5/13/2021   Annual Exam:  If you checked off any problems, how difficult have these problems made it for you to do your work, take care of things at home, or get along with other people?: Somewhat difficult  PHQ9 TOTAL SCORE: 8    "

## 2021-05-14 NOTE — PATIENT INSTRUCTIONS
PLAN:   1.   Symptomatic therapy suggested: Increase calcium to 1000mg and 800iu Vit D  Continue current medications as prescribed.   2.  Orders Placed This Encounter   Medications     doxycycline hyclate (VIBRAMYCIN) 100 MG capsule     Sig: Take 1 capsule (100 mg) by mouth daily     Dispense:  90 capsule     Refill:  3     buPROPion (WELLBUTRIN XL) 300 MG 24 hr tablet     Sig: Take 1 tablet (300 mg) by mouth every morning     Dispense:  90 tablet     Refill:  3     Orders Placed This Encounter   Procedures     REVIEW OF HEALTH MAINTENANCE PROTOCOL ORDERS     *MA Screening Digital Bilateral     Pap imaged thin layer screen with HPV - recommended age 30 - 65 years (select HPV order below)     HPV High Risk Types DNA Cervical     Lipid panel reflex to direct LDL Fasting     JUST IN CASE     Comprehensive metabolic panel     TSH with free T4 reflex     Home Blood Pressure Monitor Order for DME - ONLY FOR DME       3. Patient needs to follow up in if no improvement,or sooner if worsening of symptoms or other symptoms develop.  FURTHER TESTING:       - mammogram  I will place order. Please call [phone] to schedule.  Start monitoring BP periodically at home   Will follow up and/or notify patient of  results via My Chart to determine further need for followup  Follow up office visit in one year for annual health maintenance exam, sooner PRN.

## 2021-05-15 ASSESSMENT — ANXIETY QUESTIONNAIRES: GAD7 TOTAL SCORE: 6

## 2021-05-15 NOTE — RESULT ENCOUNTER NOTE
Emiliano Rodríguez,    Attached are your test results.  -LDL(bad) cholesterol level is elevated which can increase your heart disease risk.  A diet high in fat and simple carbohydrates, genetics and being overweight can contribute to this. ADVISE: exercising 150 minutes of aerobic exercise per week (30 minutes for 5 days per week or 50 minutes for 3 days per week are options) and eating a low saturated fat/low carbohydrate diet are helpful to improve this. In 3 months, you should recheck your fasting cholesterol panel by scheduling a lab-only appointment.  -Liver and gallbladder tests (ALT,AST, Alk phos,bilirubin) are normal.  -Kidney function (GFR) is normal.  -Sodium is decreased.  ADVISE: rechecking this in 3 month.  -Potassium is normal.  -Calcium is normal.  -Glucose (diabetic screening test) is normal.  -TSH (thyroid stimulating hormone) level is normal which indicates normal thyroid function.   Please contact us if you have any questions.    Zoraida Leal, CNP

## 2021-05-18 LAB
COPATH REPORT: NORMAL
PAP: NORMAL

## 2021-05-19 LAB
FINAL DIAGNOSIS: NORMAL
HPV HR 12 DNA CVX QL NAA+PROBE: NEGATIVE
HPV16 DNA SPEC QL NAA+PROBE: NEGATIVE
HPV18 DNA SPEC QL NAA+PROBE: NEGATIVE
SPECIMEN DESCRIPTION: NORMAL
SPECIMEN SOURCE CVX/VAG CYTO: NORMAL

## 2021-06-04 ENCOUNTER — ALLIED HEALTH/NURSE VISIT (OUTPATIENT)
Dept: NURSING | Facility: CLINIC | Age: 56
End: 2021-06-04
Payer: COMMERCIAL

## 2021-06-04 VITALS — HEART RATE: 75 BPM | SYSTOLIC BLOOD PRESSURE: 160 MMHG | DIASTOLIC BLOOD PRESSURE: 89 MMHG

## 2021-06-04 DIAGNOSIS — Z01.30 BLOOD PRESSURE CHECK: Primary | ICD-10-CM

## 2021-06-04 PROCEDURE — 99207 PR NO CHARGE NURSE ONLY: CPT

## 2021-06-04 ASSESSMENT — PAIN SCALES - GENERAL: PAINLEVEL: MILD PAIN (2)

## 2021-06-04 NOTE — PROGRESS NOTES
Patient presents for a Blood pressure check.  She had gone into the ER on 5/27/21 for elevated BP.  She started on Lisinopril 20 mg daily.  She has been tolerating the medications without side effects.    While sitting in the lobby for BP recheck, she vomited in an emesis bag. She is visibly shaky, but alert and steady.  In the exam room, she states she started getting nauseated prior to coming to the appointment.      She also complains of lightheadedness, very dull headache, states she wouldn't even take anything for it.    Assisted patient to the exam table with legs up and slightly elevated back. She remained alert and responsive.    Her BP was 162/90 with pulse of 84.  She states her headache is not bad enough where she would take anything for it.    Upon recheck, 160/89, pulse 75.    Huddle with ANGI Kelly CNP, patient should get picked up and go to the ED now.    Patient called her , she got up and walked out, stand by walking assistance to the car and explained to the patient's  the status.      Advised that if she had worsening symptoms or passes out, to pull over and call 911.  They both agree to plan.    Mariluz Manley RN, [facility]

## 2021-06-07 ENCOUNTER — TELEPHONE (OUTPATIENT)
Dept: FAMILY MEDICINE | Facility: CLINIC | Age: 56
End: 2021-06-07

## 2021-06-07 DIAGNOSIS — E87.1 HYPONATREMIA: ICD-10-CM

## 2021-06-07 DIAGNOSIS — I10 ESSENTIAL HYPERTENSION: Primary | ICD-10-CM

## 2021-06-07 NOTE — TELEPHONE ENCOUNTER
Reason for Call:  Other appointment    Detailed comments: patient needs an order for blood test and BP check.  Patient's last NA blood test was abnormal and needs a re check today.  If needed please order and have patient scheduled at  Clinic.  Also needs a f/u appointment as well with Zoraida Leal CNP at .  Thank you.    Phone Number Patient can be reached at: Home number on file [phone] (home)    Best Time: any    Can we leave a detailed message on this number? YES    Call taken on 6/7/2021 at 10:42 AM by Genevieve Leung

## 2021-06-08 NOTE — TELEPHONE ENCOUNTER
This writer attempted to contact pt on 06/08/21      Reason for call schedule Follow-up visit and left message.      If patient calls back:   Schedule Office Visit appointment maybe Thursday same day slot with PCP, document that pt called and close encounter. Labs were placed by PCP        Sierra Archer

## 2021-06-09 DIAGNOSIS — E87.1 HYPONATREMIA: ICD-10-CM

## 2021-06-09 DIAGNOSIS — I10 ESSENTIAL HYPERTENSION: ICD-10-CM

## 2021-06-09 LAB
ANION GAP SERPL CALCULATED.3IONS-SCNC: 6 MMOL/L (ref 3–14)
BUN SERPL-MCNC: 7 MG/DL (ref 7–30)
CALCIUM SERPL-MCNC: 9 MG/DL (ref 8.5–10.1)
CHLORIDE SERPL-SCNC: 91 MMOL/L (ref 94–109)
CO2 SERPL-SCNC: 27 MMOL/L (ref 20–32)
CREAT SERPL-MCNC: 0.53 MG/DL (ref 0.52–1.04)
GFR SERPL CREATININE-BSD FRML MDRD: >90 ML/MIN/{1.73_M2}
GLUCOSE SERPL-MCNC: 87 MG/DL (ref 70–99)
POTASSIUM SERPL-SCNC: 4.5 MMOL/L (ref 3.4–5.3)
SODIUM SERPL-SCNC: 124 MMOL/L (ref 133–144)

## 2021-06-09 PROCEDURE — 80048 BASIC METABOLIC PNL TOTAL CA: CPT | Performed by: NURSE PRACTITIONER

## 2021-06-09 PROCEDURE — 36415 COLL VENOUS BLD VENIPUNCTURE: CPT | Performed by: NURSE PRACTITIONER

## 2021-06-09 NOTE — PROGRESS NOTES
Trudy Palma is a 56 year old who presents for the following health issues     HPI     ED/UC Followup:    Facility:  [facility]  Date of visit: 5/27/21,6/4/21  Reason for visit: hyponatremia, hypertension  Current Status: improved - started Lisinopril     Now is not drinking much alcohol but admits had been   Yesterday had 2 keystone lights and the day before 1.5   Before was drinking about 6 beverages a day   Not sure why was drinking   Did not have difficulties decreasing her ETOH intake   No alcohol in the morning always in the evening   Had completely elimated her salt to help her BP but was still drinking alcohol   Always kind of lightheaded but that is better.   Is drinking propel every day for the last 3 days   Does not drink a lot of water. Drinking water through the night. Used to drink hardly any water and now is really. Drinks 2 propel 24 oz a day and 2 bottles of water   Would usually drink about 5 diet coke a day but this is not new for her   Used to snack all day and stated lots of salty foods and lunch meat     Depression and Anxiety Follow-Up    How are you doing with your depression since your last visit? Worsened     How are you doing with your anxiety since your last visit?  Worsened     Are you having other symptoms that might be associated with depression or anxiety? Yes:  Panic sx's (improved), trouble (trouble falling asleep)    Have you had a significant life event? OTHER:  recently had cancer, going to have hernia surgery coming up, money issues, client stopped drinking alcohol     Do you have any concerns with your use of alcohol or other drugs? No   is doing better but he is supportive but states not as much alcohol as she     Social History     Tobacco Use     Smoking status: Never Smoker     Smokeless tobacco: Never Used   Substance Use Topics     Alcohol use: Yes     Alcohol/week: 42.0 standard drinks     Types: 42 Cans of beer per week     Drug use:  No     PHQ 5/13/2021 5/14/2021 6/10/2021   PHQ-9 Total Score 8 5 7   Q9: Thoughts of better off dead/self-harm past 2 weeks Not at all Not at all Not at all     BHARAT-7 SCORE 4/29/2019 5/14/2021 6/10/2021   Total Score - - 15 (severe anxiety)   Total Score 1 6 15     Last PHQ-9 6/10/2021   1.  Little interest or pleasure in doing things 1   2.  Feeling down, depressed, or hopeless 1   3.  Trouble falling or staying asleep, or sleeping too much 1   4.  Feeling tired or having little energy 1   5.  Poor appetite or overeating 1   6.  Feeling bad about yourself 2   7.  Trouble concentrating 0   8.  Moving slowly or restless 0   Q9: Thoughts of better off dead/self-harm past 2 weeks 0   PHQ-9 Total Score 7   Difficulty at work, home, or with people -     BHARAT-7  6/10/2021   1. Feeling nervous, anxious, or on edge 3   2. Not being able to stop or control worrying 3   3. Worrying too much about different things 3   4. Trouble relaxing 3   5. Being so restless that it is hard to sit still 1   6. Becoming easily annoyed or irritable 1   7. Feeling afraid, as if something awful might happen 1   BHARAT-7 Total Score 15   If you checked any problems, how difficult have they made it for you to do your work, take care of things at home, or get along with other people? -       Suicide Assessment Five-step Evaluation and Treatment (SAFE-T)        How many servings of fruits and vegetables do you eat daily?  2-3    On average, how many sweetened beverages do you drink each day (Examples: soda, juice, sweet tea, etc.  Do NOT count diet or artificially sweetened beverages)?   0    How many days per week do you exercise enough to make your heart beat faster? Not recently     How many minutes a day do you exercise enough to make your heart beat faster? NA    How many days per week do you miss taking your medication? 0      Review of Systems   CONSTITUTIONAL:NEGATIVE for fever, chills, change in weight and POSITIVE  for fatigue  ENT/MOUTH:  NEGATIVE for ear, mouth and throat problems  RESP:NEGATIVE for significant cough or SOB  CV: POSITIVE for palpitations at least once a day  and NEGATIVE for chest pain/chest pressure, dyspnea on exertion, HX HTN and lower extremity edema  GI: NEGATIVE for nausea, abdominal pain, heartburn, or change in bowel habits  MUSCULOSKELETAL: NEGATIVE for significant arthralgias or myalgia  NEURO: POSITIVE for dizziness/lightheadedness and NEGATIVE for involuntary movements, gait disturbance, loss of consciousness and memory problems  ENDOCRINE: NEGATIVE for temperature intolerance, skin/hair changes  HEME/ALLERGY/IMMUNE: NEGATIVE for bleeding problems  PSYCHIATRIC: POSITIVE forHx anxiety and Hx depression and NEGATIVE forthoughts of hurting someone else and thoughts of self harm      Objective    /85 (BP Location: Right arm, Patient Position: Sitting, Cuff Size: Adult Regular)   Pulse 82   Temp 97.9  F (36.6  C) (Oral)   Resp 16   Wt 56.2 kg (123 lb 12.8 oz)   SpO2 98%   BMI 21.42 kg/m    Body mass index is 21.42 kg/m .   BP Readings from Last 6 Encounters:   06/10/21 126/85   06/04/21 (!) 160/89   05/14/21 (!) 144/84   04/29/19 120/70   08/09/18 124/66   06/28/18 109/70       Physical Exam   GENERAL: Patient is well nourished, well developed,in no apparent distress, non-toxic, in no respiratory distress and acyanotic, alert, cooperative and well hydrated  EYES: Eyes grossly normal to inspection and conjunctivae and sclerae normal  HENT:ear canals and TM's normal and nose and mouth without ulcers or lesions   NECK:normal, supple and no adenopathy  CARDIAC:regular rates and rhythm, no murmur, click or rub and no irregular beats  without LE edema bilaterally  RESP: normal respiratory rate and rhythm, lungs clear to auscultation  unlabored respirations, no intercostal retractions or accessory muscle use  ABD:soft, nontender  SKIN: Skin color, texture, turgor normal. No rashes or lesions.  MS: extremities normal- no  gross deformities noted, gait normal and normal muscle tone  NEURO: Normal strength and tone, sensory exam grossly normal, mentation intact, speech normal and normal strength throughout  PSYCH: Alert, oriented, thought content appropriate,mentation appears normal., affect and mood normal      Results for orders placed or performed in visit on 06/10/21   XR Chest 2 Views     Status: None    Narrative    XR CHEST 2 VW  6/10/2021 10:01 AM       INDICATION: Hyponatremia, essential hypertension  COMPARISON: None       Impression    IMPRESSION: Mildly prominent ascending aorta. Heart size normal. The  lungs and pleural spaces are clear.    JAIRO RUFFIN MD   Results for orders placed or performed in visit on 06/10/21   Basic metabolic panel     Status: Abnormal   Result Value Ref Range    Sodium 126 (L) 133 - 144 mmol/L    Potassium 4.1 3.4 - 5.3 mmol/L    Chloride 91 (L) 94 - 109 mmol/L    Carbon Dioxide 29 20 - 32 mmol/L    Anion Gap 6 3 - 14 mmol/L    Glucose 84 70 - 99 mg/dL    Urea Nitrogen 9 7 - 30 mg/dL    Creatinine 0.63 0.52 - 1.04 mg/dL    GFR Estimate >90 >60 mL/min/[1.73_m2]    GFR Estimate If Black >90 >60 mL/min/[1.73_m2]    Calcium 9.5 8.5 - 10.1 mg/dL   Osmolality     Status: Abnormal   Result Value Ref Range    Osmolality 265 (L) 275 - 295 mmol/kg   Osmolality urine     Status: None   Result Value Ref Range    Urine Osmolality 339 100 - 1,200 mmol/kg   Sodium random urine     Status: None   Result Value Ref Range    Sodium Urine mmol/L 16 mmol/L     Assessment & Plan     Hyponatremia  - Basic metabolic panel  - XR Chest 2 Views  - Osmolality  - Osmolality urine  - Sodium random urine  - Basic metabolic panel  - Osmolality  Will follow up and/or notify patient of  results via My Chart to determine further need for followup  Will consume her normal amounts of fluids vs her recent effort to drink more fluids suspect she is overcompensating with that and significant sodium restriction from her diet but was  continuing to drink ETOH which she has restricted considerably   Essential hypertension  Will follow up and/or notify patient of  results via My Chart to determine further need for followup  - XR Chest 2 Views  HTN Plan:  1)  Medication: continue current medication regimen unchanged  2)  Dietary sodium restriction  3)  Regular aerobic exercise  4)  Recheck in 1 month, sooner should new symptoms or   problems arise.  5) See todays orders.    Patient Education: Reviewed risks of hypertension and principles of   treatment.        Ethmoid sinusitis, unspecified chronicity  Reviewed CT of head done in the ER and noted sinusitis  Will Start:  - amoxicillin (AMOXIL) 875 MG tablet  Dispense: 20 tablet; Refill: 0    Alcohol Dependence  1 Limit alcohol consumption to less than 2 drinks per day (1 drink=5 oz.wine, 12 oz. Beer or 1.5 oz. 80-proof liquor).  Would continue to work to eliminate ETOH and will recommend counseling  Initiated consultation with [facility] Culp  for mental health counseling.   Discussed the pathophysiology of anxiety episodes and the various symptoms seen associated with anxiety episodes.  Discussed possible triggers including fatigue, depression, stress, and chemicals such as alcohol, caffeine and certain drugs.  Discussed the treatment including an aerobic exercise program, adequate rest, and both rescue meds and maintenance meds.      [facility]  [facility]         See Patient Instructions  Patient Instructions     PLAN:   1.   Symptomatic therapy suggested: continue to eliminate alcohol   2.  Orders Placed This Encounter   Medications     lisinopril (ZESTRIL) 20 MG tablet     Sig: Take 20 mg by mouth daily     amoxicillin (AMOXIL) 875 MG tablet     Sig: Take 1 tablet (875 mg) by mouth 2 times daily     Dispense:  20 tablet     Refill:  0     Orders Placed This Encounter   Procedures     XR Chest 2 Views     Basic metabolic  panel     Osmolality     Osmolality urine     Sodium random urine       3. Patient needs to follow up in if no improvement,or sooner if worsening of symptoms or other symptoms develop.  Initiated consultation with [facility] Culp  for mental health counseling.   Will follow up and/or notify patient of  results via My Chart to determine further need for followup      No follow-ups on file.    ANGI Simon [facility]          Answers for HPI/ROS submitted by the patient on 6/10/2021   If you checked off any problems, how difficult have these problems made it for you to do your work, take care of things at home, or get along with other people?: Somewhat difficult  PHQ9 TOTAL SCORE: 7  BHARAT 7 TOTAL SCORE: 15

## 2021-06-10 ENCOUNTER — OFFICE VISIT (OUTPATIENT)
Dept: FAMILY MEDICINE | Facility: CLINIC | Age: 56
End: 2021-06-10
Payer: COMMERCIAL

## 2021-06-10 ENCOUNTER — ANCILLARY PROCEDURE (OUTPATIENT)
Dept: GENERAL RADIOLOGY | Facility: CLINIC | Age: 56
End: 2021-06-10
Attending: NURSE PRACTITIONER
Payer: COMMERCIAL

## 2021-06-10 VITALS
HEART RATE: 82 BPM | WEIGHT: 123.8 LBS | SYSTOLIC BLOOD PRESSURE: 126 MMHG | BODY MASS INDEX: 21.42 KG/M2 | TEMPERATURE: 97.9 F | OXYGEN SATURATION: 98 % | RESPIRATION RATE: 16 BRPM | DIASTOLIC BLOOD PRESSURE: 85 MMHG

## 2021-06-10 DIAGNOSIS — E87.1 HYPONATREMIA: Primary | ICD-10-CM

## 2021-06-10 DIAGNOSIS — E87.1 HYPONATREMIA: ICD-10-CM

## 2021-06-10 DIAGNOSIS — F10.29 ALCOHOL DEPENDENCE WITH UNSPECIFIED ALCOHOL-INDUCED DISORDER (H): ICD-10-CM

## 2021-06-10 DIAGNOSIS — J32.2 ETHMOID SINUSITIS, UNSPECIFIED CHRONICITY: ICD-10-CM

## 2021-06-10 DIAGNOSIS — I10 ESSENTIAL HYPERTENSION: ICD-10-CM

## 2021-06-10 LAB
ANION GAP SERPL CALCULATED.3IONS-SCNC: 6 MMOL/L (ref 3–14)
BUN SERPL-MCNC: 9 MG/DL (ref 7–30)
CALCIUM SERPL-MCNC: 9.5 MG/DL (ref 8.5–10.1)
CHLORIDE SERPL-SCNC: 91 MMOL/L (ref 94–109)
CO2 SERPL-SCNC: 29 MMOL/L (ref 20–32)
CREAT SERPL-MCNC: 0.63 MG/DL (ref 0.52–1.04)
GFR SERPL CREATININE-BSD FRML MDRD: >90 ML/MIN/{1.73_M2}
GLUCOSE SERPL-MCNC: 84 MG/DL (ref 70–99)
OSMOLALITY SERPL: 265 MMOL/KG (ref 275–295)
OSMOLALITY UR: 339 MMOL/KG (ref 100–1200)
POTASSIUM SERPL-SCNC: 4.1 MMOL/L (ref 3.4–5.3)
SODIUM SERPL-SCNC: 126 MMOL/L (ref 133–144)
SODIUM UR-SCNC: 16 MMOL/L

## 2021-06-10 PROCEDURE — 71046 X-RAY EXAM CHEST 2 VIEWS: CPT | Mod: FY | Performed by: FAMILY MEDICINE

## 2021-06-10 PROCEDURE — 83930 ASSAY OF BLOOD OSMOLALITY: CPT | Performed by: NURSE PRACTITIONER

## 2021-06-10 PROCEDURE — 83935 ASSAY OF URINE OSMOLALITY: CPT | Mod: 90 | Performed by: NURSE PRACTITIONER

## 2021-06-10 PROCEDURE — 99214 OFFICE O/P EST MOD 30 MIN: CPT | Performed by: NURSE PRACTITIONER

## 2021-06-10 PROCEDURE — 80048 BASIC METABOLIC PNL TOTAL CA: CPT | Performed by: NURSE PRACTITIONER

## 2021-06-10 PROCEDURE — 36415 COLL VENOUS BLD VENIPUNCTURE: CPT | Performed by: NURSE PRACTITIONER

## 2021-06-10 PROCEDURE — 99000 SPECIMEN HANDLING OFFICE-LAB: CPT | Performed by: NURSE PRACTITIONER

## 2021-06-10 PROCEDURE — 84300 ASSAY OF URINE SODIUM: CPT | Performed by: NURSE PRACTITIONER

## 2021-06-10 RX ORDER — LISINOPRIL 20 MG/1
20 TABLET ORAL DAILY
COMMUNITY
Start: 2021-05-27 | End: 2023-03-10

## 2021-06-10 RX ORDER — AMOXICILLIN 875 MG
875 TABLET ORAL 2 TIMES DAILY
Qty: 20 TABLET | Refills: 0 | Status: SHIPPED | OUTPATIENT
Start: 2021-06-10 | End: 2023-03-10

## 2021-06-10 ASSESSMENT — ANXIETY QUESTIONNAIRES
GAD7 TOTAL SCORE: 15
2. NOT BEING ABLE TO STOP OR CONTROL WORRYING: NEARLY EVERY DAY
GAD7 TOTAL SCORE: 15
6. BECOMING EASILY ANNOYED OR IRRITABLE: SEVERAL DAYS
4. TROUBLE RELAXING: NEARLY EVERY DAY
7. FEELING AFRAID AS IF SOMETHING AWFUL MIGHT HAPPEN: SEVERAL DAYS
7. FEELING AFRAID AS IF SOMETHING AWFUL MIGHT HAPPEN: SEVERAL DAYS
3. WORRYING TOO MUCH ABOUT DIFFERENT THINGS: NEARLY EVERY DAY
1. FEELING NERVOUS, ANXIOUS, OR ON EDGE: NEARLY EVERY DAY
GAD7 TOTAL SCORE: 15
5. BEING SO RESTLESS THAT IT IS HARD TO SIT STILL: SEVERAL DAYS

## 2021-06-10 ASSESSMENT — PATIENT HEALTH QUESTIONNAIRE - PHQ9
SUM OF ALL RESPONSES TO PHQ QUESTIONS 1-9: 7
SUM OF ALL RESPONSES TO PHQ QUESTIONS 1-9: 7
10. IF YOU CHECKED OFF ANY PROBLEMS, HOW DIFFICULT HAVE THESE PROBLEMS MADE IT FOR YOU TO DO YOUR WORK, TAKE CARE OF THINGS AT HOME, OR GET ALONG WITH OTHER PEOPLE: SOMEWHAT DIFFICULT

## 2021-06-10 ASSESSMENT — PAIN SCALES - GENERAL: PAINLEVEL: NO PAIN (0)

## 2021-06-10 NOTE — RESULT ENCOUNTER NOTE
Emiliano Rodríguez,    Attached are your test results.  -Kidney function (GFR) is normal.  -Sodium is decreased.  ADVISE: your sodium is concerning low please consider going back into the ER. It looks like they had wanted you to be admitted at your last ER visit.   -Potassium is normal.  -Calcium is normal.  -Glucose is normal.   Please contact us if you have any questions.    Zoraida Leal, CNP

## 2021-06-10 NOTE — RESULT ENCOUNTER NOTE
Emiliano Rodríguez,    Attached are your test results.  -Chest xray was normal   Please contact us if you have any questions.    Zoraida Leal, CNP

## 2021-06-10 NOTE — RESULT ENCOUNTER NOTE
Emiliano Rodríguez,    Attached are your test results.  -Kidney function (GFR) is normal.  -Sodium is decreased.  ADVISE: however slightly better. OK will see what everything else shows. Go back to your regular diet without such a severe restriction of sodium, would decrease alcohol to at most one a day or less. .  Will recheck tomorrow lab only before the weekend comes.   -Potassium is normal.  -Calcium is normal.  -Glucose is normal.   Please contact us if you have any questions.    Zoraida Leal, CNP

## 2021-06-10 NOTE — PATIENT INSTRUCTIONS
PLAN:   1.   Symptomatic therapy suggested: continue to eliminate alcohol   2.  Orders Placed This Encounter   Medications     lisinopril (ZESTRIL) 20 MG tablet     Sig: Take 20 mg by mouth daily     amoxicillin (AMOXIL) 875 MG tablet     Sig: Take 1 tablet (875 mg) by mouth 2 times daily     Dispense:  20 tablet     Refill:  0     Orders Placed This Encounter   Procedures     XR Chest 2 Views     Basic metabolic panel     Osmolality     Osmolality urine     Sodium random urine       3. Patient needs to follow up in if no improvement,or sooner if worsening of symptoms or other symptoms develop.  Initiated consultation with [facility] Culp  for mental health counseling.   Will follow up and/or notify patient of  results via My Chart to determine further need for followup

## 2021-06-11 ASSESSMENT — PATIENT HEALTH QUESTIONNAIRE - PHQ9: SUM OF ALL RESPONSES TO PHQ QUESTIONS 1-9: 7

## 2021-06-11 ASSESSMENT — ANXIETY QUESTIONNAIRES: GAD7 TOTAL SCORE: 15

## 2021-06-14 ENCOUNTER — TELEPHONE (OUTPATIENT)
Dept: BEHAVIORAL HEALTH | Facility: CLINIC | Age: 56
End: 2021-06-14

## 2021-06-14 NOTE — TELEPHONE ENCOUNTER
Reached out to pt to offer [facility] services per the request of Zoraida Leal. Left voicemail with behavioral intakes number to schedule appt.

## 2021-06-17 ENCOUNTER — TELEPHONE (OUTPATIENT)
Dept: FAMILY MEDICINE | Facility: CLINIC | Age: 56
End: 2021-06-17

## 2021-06-17 DIAGNOSIS — E87.1 HYPONATREMIA: ICD-10-CM

## 2021-06-17 DIAGNOSIS — Z13.6 CARDIOVASCULAR SCREENING; LDL GOAL LESS THAN 130: ICD-10-CM

## 2021-06-17 LAB
ALBUMIN SERPL-MCNC: 4.1 G/DL (ref 3.4–5)
ALP SERPL-CCNC: 55 U/L (ref 40–150)
ALT SERPL W P-5'-P-CCNC: 25 U/L (ref 0–50)
ANION GAP SERPL CALCULATED.3IONS-SCNC: 4 MMOL/L (ref 3–14)
AST SERPL W P-5'-P-CCNC: 18 U/L (ref 0–45)
BILIRUB SERPL-MCNC: 0.4 MG/DL (ref 0.2–1.3)
BUN SERPL-MCNC: 13 MG/DL (ref 7–30)
CALCIUM SERPL-MCNC: 9.5 MG/DL (ref 8.5–10.1)
CHLORIDE SERPL-SCNC: 96 MMOL/L (ref 94–109)
CHOLEST SERPL-MCNC: 239 MG/DL
CO2 SERPL-SCNC: 30 MMOL/L (ref 20–32)
CREAT SERPL-MCNC: 0.57 MG/DL (ref 0.52–1.04)
GFR SERPL CREATININE-BSD FRML MDRD: >90 ML/MIN/{1.73_M2}
GLUCOSE SERPL-MCNC: 92 MG/DL (ref 70–99)
HDLC SERPL-MCNC: 81 MG/DL
LDLC SERPL CALC-MCNC: 145 MG/DL
NONHDLC SERPL-MCNC: 158 MG/DL
OSMOLALITY SERPL: 276 MMOL/KG (ref 275–295)
POTASSIUM SERPL-SCNC: 4.6 MMOL/L (ref 3.4–5.3)
PROT SERPL-MCNC: 7.4 G/DL (ref 6.8–8.8)
SODIUM SERPL-SCNC: 130 MMOL/L (ref 133–144)
TRIGL SERPL-MCNC: 63 MG/DL

## 2021-06-17 PROCEDURE — 80061 LIPID PANEL: CPT | Performed by: NURSE PRACTITIONER

## 2021-06-17 PROCEDURE — 36415 COLL VENOUS BLD VENIPUNCTURE: CPT | Performed by: NURSE PRACTITIONER

## 2021-06-17 PROCEDURE — 80053 COMPREHEN METABOLIC PANEL: CPT | Performed by: NURSE PRACTITIONER

## 2021-06-17 PROCEDURE — 83930 ASSAY OF BLOOD OSMOLALITY: CPT | Performed by: NURSE PRACTITIONER

## 2021-06-17 NOTE — TELEPHONE ENCOUNTER
Patient Quality Outreach Summary      Summary:    Patient is due/failing the following:   Breast Cancer Screening - Mammogram    Type of outreach:    Sent [employer] message.    Questions for provider review:    None                                                                                                                    Shantel Massey

## 2021-06-18 NOTE — RESULT ENCOUNTER NOTE
Emiliano Rodríguez,    Attached are your test results.  -LDL(bad) cholesterol level is elevated which can increase your heart disease risk.  A diet high in fat and simple carbohydrates, genetics and being overweight can contribute to this. ADVISE: exercising 150 minutes of aerobic exercise per week (30 minutes for 5 days per week or 50 minutes for 3 days per week are options) and eating a low saturated fat/low carbohydrate diet are helpful to improve this. In 12 months, you should recheck your fasting cholesterol panel by scheduling a lab-only appointment.  -Liver and gallbladder tests (ALT,AST, Alk phos,bilirubin) are normal.  -Kidney function (GFR) is normal.  -Sodium is almost normal. Make follow up in 1 month  -Potassium is normal.  -Calcium is normal.  -Glucose is normal.   Please contact us if you have any questions.    Zoraida Leal, CNP

## 2021-09-26 ENCOUNTER — HEALTH MAINTENANCE LETTER (OUTPATIENT)
Age: 56
End: 2021-09-26

## 2022-03-13 ENCOUNTER — HEALTH MAINTENANCE LETTER (OUTPATIENT)
Age: 57
End: 2022-03-13

## 2022-07-03 ENCOUNTER — HEALTH MAINTENANCE LETTER (OUTPATIENT)
Age: 57
End: 2022-07-03

## 2022-08-08 DIAGNOSIS — F41.8 DEPRESSION WITH ANXIETY: ICD-10-CM

## 2022-08-08 RX ORDER — BUPROPION HYDROCHLORIDE 300 MG/1
TABLET ORAL
Qty: 90 TABLET | Refills: 0 | Status: SHIPPED | OUTPATIENT
Start: 2022-08-08 | End: 2023-03-10

## 2022-08-08 NOTE — TELEPHONE ENCOUNTER
This writer attempted to contact pt on 08/08/22      Reason for call rx refill and unable to leave message.      If patient calls back:   Schedule physical (soonest/any slot available per caitlyn rosales) appointment asap with PCP, Inform pt sent Depression/Anxiety Questionnaire through [employer].        Linda Sims CMA

## 2022-08-08 NOTE — TELEPHONE ENCOUNTER
One refill given   Needs in person follow up appointment as been over a year since last visit   OK to use same day, NP or hospital follow up slot   OK to place in as a physical   Please update PHQ 9 and BHARAT  Thanks          PHQ-9 SCORE 5/13/2021 5/14/2021 6/10/2021   PHQ-9 Total Score MyChart 8 (Mild depression) - 7 (Mild depression)   PHQ-9 Total Score 8 5 7       BHARAT-7 SCORE 4/29/2019 5/14/2021 6/10/2021   Total Score - - 15 (severe anxiety)   Total Score 1 6 15       [facility] Follow-up to PHQ 5/13/2021 5/14/2021 6/10/2021   PHQ-9 9. Suicide Ideation past 2 weeks Not at all Not at all Not at all

## 2022-08-10 NOTE — TELEPHONE ENCOUNTER
* 2ND ATTThis writer attempted to contact pt on 08/08/22        Reason for call rx refill and unable to leave message.        If patient calls back:              Schedule physical (soonest/any slot available per caitlyn rosales) appointment asap with PCP, Inform pt sent Depression/Anxiety Questionnaire through [employer].           Linda Sims CMA

## 2022-08-12 NOTE — TELEPHONE ENCOUNTER
Attempted to reach pt to relay message below and help schedule appointment. No answer and unable to leave a VM as mailbox is full. Will send pt a [email or web address] message.  Allison Rodriguez CMA

## 2023-03-01 ENCOUNTER — TRANSFERRED RECORDS (OUTPATIENT)
Dept: HEALTH INFORMATION MANAGEMENT | Facility: CLINIC | Age: 58
End: 2023-03-01

## 2023-03-10 ENCOUNTER — OFFICE VISIT (OUTPATIENT)
Dept: FAMILY MEDICINE | Facility: CLINIC | Age: 58
End: 2023-03-10
Payer: COMMERCIAL

## 2023-03-10 VITALS
HEIGHT: 64 IN | WEIGHT: 118.1 LBS | BODY MASS INDEX: 20.16 KG/M2 | HEART RATE: 85 BPM | DIASTOLIC BLOOD PRESSURE: 89 MMHG | RESPIRATION RATE: 12 BRPM | SYSTOLIC BLOOD PRESSURE: 130 MMHG | OXYGEN SATURATION: 98 % | TEMPERATURE: 97.6 F

## 2023-03-10 DIAGNOSIS — L71.9 ROSACEA: ICD-10-CM

## 2023-03-10 DIAGNOSIS — F10.20 ALCOHOLISM (H): ICD-10-CM

## 2023-03-10 DIAGNOSIS — M54.17 LUMBOSACRAL RADICULOPATHY AT L3: Primary | ICD-10-CM

## 2023-03-10 DIAGNOSIS — E87.1 HYPONATREMIA: ICD-10-CM

## 2023-03-10 DIAGNOSIS — F32.5 MAJOR DEPRESSION IN REMISSION (H): ICD-10-CM

## 2023-03-10 PROCEDURE — 99214 OFFICE O/P EST MOD 30 MIN: CPT | Performed by: INTERNAL MEDICINE

## 2023-03-10 RX ORDER — PREDNISONE 20 MG/1
TABLET ORAL
Qty: 12 TABLET | Refills: 0 | Status: SHIPPED | OUTPATIENT
Start: 2023-03-10 | End: 2024-04-26

## 2023-03-10 RX ORDER — BUPROPION HYDROCHLORIDE 300 MG/1
300 TABLET ORAL EVERY MORNING
Qty: 90 TABLET | Refills: 0 | Status: SHIPPED | OUTPATIENT
Start: 2023-03-10 | End: 2024-04-26

## 2023-03-10 RX ORDER — DOXYCYCLINE 100 MG/1
100 CAPSULE ORAL DAILY
Qty: 90 CAPSULE | Refills: 0 | Status: SHIPPED | OUTPATIENT
Start: 2023-03-10 | End: 2024-04-26

## 2023-03-10 ASSESSMENT — PATIENT HEALTH QUESTIONNAIRE - PHQ9
10. IF YOU CHECKED OFF ANY PROBLEMS, HOW DIFFICULT HAVE THESE PROBLEMS MADE IT FOR YOU TO DO YOUR WORK, TAKE CARE OF THINGS AT HOME, OR GET ALONG WITH OTHER PEOPLE: SOMEWHAT DIFFICULT
SUM OF ALL RESPONSES TO PHQ QUESTIONS 1-9: 5
SUM OF ALL RESPONSES TO PHQ QUESTIONS 1-9: 5

## 2023-03-10 ASSESSMENT — PAIN SCALES - GENERAL: PAINLEVEL: MILD PAIN (2)

## 2023-03-10 NOTE — PROGRESS NOTES
Assessment & Plan     1.  Lumbosacral radiculopathy at the level L3 on the right.  There is no neurological deficit on clinical exam.  Recommend conservative treatment.  Will refer to physical therapy and in the meantime a short course of prednisone.  40 mg for 4 days followed by 20 mg for 4 days.  Epidural injection could also be considered.  2.  Hyponatremia secondary to alcoholism.  Sodium on 3/1/2023 was 124.  3.  Alcoholism.  Discussed referral to chemical dependency.  Patient declined.  She will discuss with her primary provider.  4.  Major depression in remission with Wellbutrin.  Patient requesting refill at refill of provided.  4.  Rosacea.  Patient requested refill of doxycycline which I provided.    Advised follow-up with her primary provider Zoraida Leal.           MED REC REQUIRED  Post Medication Reconciliation Status:       No follow-ups on file.    Lucho Lewis MD  [facility] KEVIN Palma is a 57 year old, presenting for the following health issues:  Hospital F/U      [facility]       Hospital Follow-up Visit:    Hospital/Nursing Home/ Rehab Facility: [facility]  Date of Admission: March 1, 2023  Date of Discharge: March 2, 2023  Reason(s) for Admission: herniated discs    Was your hospitalization related to COVID-19? No   Problems taking medications regularly:  None  Medication changes since discharge: None  Problems adhering to non-medication therapy:  None    Summary of hospitalization:  CareEverywhere information obtained and reviewed  Diagnostic Tests/Treatments reviewed.  Follow up needed: none  Other Healthcare Providers Involved in Patient s Care:         None  Update since discharge: unchanged   Plan of care communicated with patient     57-year-old lady comes in complaining of pain in the right posterior back region with radiation right up to the right ankle.  The pain is worse when standing and walking.      Sitting on a hard surface of lying down.   "She has not noticed any weakness in her extremities.  The symptoms started last week and she went to the emergency room on 3/1/2023.  When she went to emergency room she was also having diarrhea.  She has been prescribed amoxicillin for sinusitis and the diarrhea taking the antibiotic.  Now it has resolved.  The work-up in the ER included CT scan of the abdomen pelvis that was negative.  An MRI of the lumbar spine was performed which revealed spondylosis at level L3-4 and L4-L5.  A central disc bulging at L4-L5 was noted with moderate stenosis.  However at level L3-4 was noted right foraminal disc extrusion with impingement.    Her CBC with differential is normal and BMP showed a low sodium of 124.  Patient is an alcoholic and has indicated she drinks 6-7 light beers a day.  Indicates she is now cut back to about 3 drinks a day after she got sick a week ago.      Review of Systems   Constitutional, HEENT, cardiovascular, pulmonary, GI, , musculoskeletal, neuro, skin, endocrine and psych systems are negative, except as otherwise noted.      Objective    /89   Pulse 85   Temp 97.6  F (36.4  C) (Oral)   Resp 12   Ht 1.613 m (5' 3.5\")   Wt 53.6 kg (118 lb 1.6 oz)   SpO2 98%   BMI 20.59 kg/m    Body mass index is 20.59 kg/m .  Physical Exam   GENERAL: healthy, alert and no distress  ABDOMEN: soft, nontender, no hepatosplenomegaly, no masses and bowel sounds normal  MS: no gross musculoskeletal defects noted, no edema.  No spinal tenderness.  Range of motion of the hip and knee is normal.  NEURO: Normal strength and tone, sensory exam grossly normal, mentation intact and deep tendon reflexes both knee and ankle +1 bilaterally symmetrical.  Strength is bilaterally symmetrical.  5 x 5.  Pain and touch perception intact.  Stating freezing is almost 90 degrees in the left leg.  On the right leg it is only 30 degrees.  BACK: no CVA tenderness, no paralumbar tenderness                    Answers for HPI/ROS " submitted by the patient on 3/10/2023  If you checked off any problems, how difficult have these problems made it for you to do your work, take care of things at home, or get along with other people?: Somewhat difficult  PHQ9 TOTAL SCORE: 5

## 2023-04-23 ENCOUNTER — HEALTH MAINTENANCE LETTER (OUTPATIENT)
Age: 58
End: 2023-04-23

## 2023-06-27 ENCOUNTER — TELEPHONE (OUTPATIENT)
Dept: FAMILY MEDICINE | Facility: CLINIC | Age: 58
End: 2023-06-27
Payer: COMMERCIAL

## 2023-06-27 NOTE — TELEPHONE ENCOUNTER
Patient Quality Outreach    Patient is due for the following:   Hypertension -  BP check  Breast Cancer Screening - Mammogram  Physical Preventive Adult Physical      Topic Date Due     Hepatitis B Vaccine (1 of 3 - 3-dose series) Never done     Pneumococcal Vaccine (1 - PCV) Never done     Zoster (Shingles) Vaccine (1 of 2) Never done     COVID-19 Vaccine (3 - Pfizer series) 06/18/2021       Next Steps:   Schedule a Adult Preventative    Type of outreach:    Sent letter.      Questions for provider review:    None           Claire Kaur MA

## 2023-06-27 NOTE — LETTER
[facility]  [address] [id number] [phone]  Dept: [phone]    June 27, 2023    Minerva Rodríguez  [address] [id number]    Dear Minerva,    At [facility] we care about your health and are committed to providing quality patient care.     Here is a list of Health Maintenance topics that are due now or due soon:  Health Maintenance Due   Topic Date Due    DEPRESSION ACTION PLAN  Never done    HEPATITIS B IMMUNIZATION (1 of 3 - 3-dose series) Never done    Pneumococcal Vaccine: Pediatrics (0 to 5 Years) and At-Risk Patients (6 to 64 Years) (1 - PCV) Never done    ZOSTER IMMUNIZATION (1 of 2) Never done    MAMMO SCREENING  02/24/2019    COVID-19 Vaccine (3 - Pfizer series) 06/18/2021    YEARLY PREVENTIVE VISIT  05/14/2022    ANNUAL REVIEW OF HM ORDERS  05/14/2022        We are recommending that you:  Schedule a  (Preventative/Physical) APPOINTMENT with your primary care provider. If you go elsewhere for your Preventive appointments then please disregard this reminder    ,   Schedule a MAMMOGRAM which is due.    1 in 8 women will develop invasive breast cancer during her lifetime and it is the most common non-skin cancer in American women.  EARLY detection, new treatments, and a better understanding of the disease have increased survival rates - the 5 year survival rate in the 1960s was 63% and today it is close to 90%.    If you are under/uninsured, we recommend you contact the [facility]. They offer mammograms at no charge or on a sliding fee charge. You can schedule with them at [phone]. Please have them send us the results.      Please disregard this reminder if you have had this exam elsewhere within the last year.  It would be helpful for us to have a copy of your mammogram report in your file so that we can best coordinate your care - please contact us with when your test was done so we can update your  record.    ,   Hyptertension: If you have a home blood pressure monitor, please respond to this message with your latest home blood pressure reading. If you do not, please schedule a free blood pressure check with our clinic.    , and   Schedule a Nurse-Only appointment to update your immunizations: Your records indicate that you are not up to date with your immunizations, please schedule a nurse-only appointment to get these updated or update them at your next office visit. If this is incorrect, please disregard.    To schedule an appointment or discuss this further, you may contact us by phone at the [facility] at [phone] or online through the patient [email or web address] @ [email or web address]    Thank you for trusting [facility] and we appreciate the opportunity to serve you.  We look forward to supporting your healthcare needs in the future.    Your partners in health,      Quality Committee at [facility]

## 2023-07-16 ENCOUNTER — HEALTH MAINTENANCE LETTER (OUTPATIENT)
Age: 58
End: 2023-07-16

## 2024-04-21 ENCOUNTER — HEALTH MAINTENANCE LETTER (OUTPATIENT)
Age: 59
End: 2024-04-21

## 2024-04-26 ENCOUNTER — OFFICE VISIT (OUTPATIENT)
Dept: FAMILY MEDICINE | Facility: CLINIC | Age: 59
End: 2024-04-26
Payer: COMMERCIAL

## 2024-04-26 VITALS
SYSTOLIC BLOOD PRESSURE: 159 MMHG | TEMPERATURE: 97.7 F | DIASTOLIC BLOOD PRESSURE: 91 MMHG | BODY MASS INDEX: 20.76 KG/M2 | HEART RATE: 74 BPM | RESPIRATION RATE: 12 BRPM | OXYGEN SATURATION: 100 % | WEIGHT: 124.6 LBS | HEIGHT: 65 IN

## 2024-04-26 DIAGNOSIS — Z12.31 VISIT FOR SCREENING MAMMOGRAM: ICD-10-CM

## 2024-04-26 DIAGNOSIS — Z13.0 SCREENING FOR DISORDER OF BLOOD AND BLOOD-FORMING ORGANS: ICD-10-CM

## 2024-04-26 DIAGNOSIS — F40.10 SOCIAL ANXIETY DISORDER: ICD-10-CM

## 2024-04-26 DIAGNOSIS — Z00.00 ROUTINE GENERAL MEDICAL EXAMINATION AT A HEALTH CARE FACILITY: Primary | ICD-10-CM

## 2024-04-26 DIAGNOSIS — Z23 NEED FOR TDAP VACCINATION: ICD-10-CM

## 2024-04-26 DIAGNOSIS — F32.5 MAJOR DEPRESSION IN REMISSION (H): ICD-10-CM

## 2024-04-26 DIAGNOSIS — E28.39 MENOPAUSE OVARIAN FAILURE: ICD-10-CM

## 2024-04-26 DIAGNOSIS — Z13.1 SCREENING FOR DIABETES MELLITUS (DM): ICD-10-CM

## 2024-04-26 DIAGNOSIS — Z13.29 SCREENING FOR THYROID DISORDER: ICD-10-CM

## 2024-04-26 DIAGNOSIS — Z13.6 CARDIOVASCULAR SCREENING; LDL GOAL LESS THAN 130: ICD-10-CM

## 2024-04-26 DIAGNOSIS — I10 PRIMARY HYPERTENSION: ICD-10-CM

## 2024-04-26 DIAGNOSIS — Z12.4 CERVICAL CANCER SCREENING: ICD-10-CM

## 2024-04-26 DIAGNOSIS — L71.9 ROSACEA: ICD-10-CM

## 2024-04-26 DIAGNOSIS — F10.20 ALCOHOLISM (H): ICD-10-CM

## 2024-04-26 LAB
ERYTHROCYTE [DISTWIDTH] IN BLOOD BY AUTOMATED COUNT: 11.9 % (ref 10–15)
HCT VFR BLD AUTO: 40.8 % (ref 35–47)
HGB BLD-MCNC: 14.1 G/DL (ref 11.7–15.7)
MCH RBC QN AUTO: 33.1 PG (ref 26.5–33)
MCHC RBC AUTO-ENTMCNC: 34.6 G/DL (ref 31.5–36.5)
MCV RBC AUTO: 96 FL (ref 78–100)
PLATELET # BLD AUTO: 349 10E3/UL (ref 150–450)
RBC # BLD AUTO: 4.26 10E6/UL (ref 3.8–5.2)
WBC # BLD AUTO: 6.4 10E3/UL (ref 4–11)

## 2024-04-26 PROCEDURE — 80061 LIPID PANEL: CPT | Performed by: NURSE PRACTITIONER

## 2024-04-26 PROCEDURE — 82043 UR ALBUMIN QUANTITATIVE: CPT | Performed by: NURSE PRACTITIONER

## 2024-04-26 PROCEDURE — 87624 HPV HI-RISK TYP POOLED RSLT: CPT | Performed by: NURSE PRACTITIONER

## 2024-04-26 PROCEDURE — 99396 PREV VISIT EST AGE 40-64: CPT | Mod: 25 | Performed by: NURSE PRACTITIONER

## 2024-04-26 PROCEDURE — 90715 TDAP VACCINE 7 YRS/> IM: CPT | Performed by: NURSE PRACTITIONER

## 2024-04-26 PROCEDURE — G0145 SCR C/V CYTO,THINLAYER,RESCR: HCPCS | Performed by: NURSE PRACTITIONER

## 2024-04-26 PROCEDURE — 90471 IMMUNIZATION ADMIN: CPT | Performed by: NURSE PRACTITIONER

## 2024-04-26 PROCEDURE — 85027 COMPLETE CBC AUTOMATED: CPT | Performed by: NURSE PRACTITIONER

## 2024-04-26 PROCEDURE — 99214 OFFICE O/P EST MOD 30 MIN: CPT | Mod: 25 | Performed by: NURSE PRACTITIONER

## 2024-04-26 PROCEDURE — 36415 COLL VENOUS BLD VENIPUNCTURE: CPT | Performed by: NURSE PRACTITIONER

## 2024-04-26 PROCEDURE — 82570 ASSAY OF URINE CREATININE: CPT | Performed by: NURSE PRACTITIONER

## 2024-04-26 PROCEDURE — 84443 ASSAY THYROID STIM HORMONE: CPT | Performed by: NURSE PRACTITIONER

## 2024-04-26 PROCEDURE — 80053 COMPREHEN METABOLIC PANEL: CPT | Performed by: NURSE PRACTITIONER

## 2024-04-26 RX ORDER — LISINOPRIL 20 MG/1
20 TABLET ORAL DAILY
Qty: 90 TABLET | Refills: 3 | Status: SHIPPED | OUTPATIENT
Start: 2024-04-26

## 2024-04-26 RX ORDER — DOXYCYCLINE 100 MG/1
100 CAPSULE ORAL DAILY
Qty: 90 CAPSULE | Refills: 0 | Status: SHIPPED | OUTPATIENT
Start: 2024-04-26 | End: 2024-08-08

## 2024-04-26 RX ORDER — BUPROPION HYDROCHLORIDE 300 MG/1
300 TABLET ORAL EVERY MORNING
Qty: 90 TABLET | Refills: 3 | Status: SHIPPED | OUTPATIENT
Start: 2024-04-26

## 2024-04-26 RX ORDER — BUPROPION HYDROCHLORIDE 300 MG/1
300 TABLET ORAL EVERY MORNING
Qty: 90 TABLET | Refills: 0 | Status: CANCELLED | OUTPATIENT
Start: 2024-04-26

## 2024-04-26 SDOH — HEALTH STABILITY: PHYSICAL HEALTH: ON AVERAGE, HOW MANY DAYS PER WEEK DO YOU ENGAGE IN MODERATE TO STRENUOUS EXERCISE (LIKE A BRISK WALK)?: 1 DAY

## 2024-04-26 ASSESSMENT — PATIENT HEALTH QUESTIONNAIRE - PHQ9
SUM OF ALL RESPONSES TO PHQ QUESTIONS 1-9: 8
SUM OF ALL RESPONSES TO PHQ QUESTIONS 1-9: 8
10. IF YOU CHECKED OFF ANY PROBLEMS, HOW DIFFICULT HAVE THESE PROBLEMS MADE IT FOR YOU TO DO YOUR WORK, TAKE CARE OF THINGS AT HOME, OR GET ALONG WITH OTHER PEOPLE: SOMEWHAT DIFFICULT

## 2024-04-26 ASSESSMENT — PAIN SCALES - GENERAL: PAINLEVEL: NO PAIN (0)

## 2024-04-26 ASSESSMENT — SOCIAL DETERMINANTS OF HEALTH (SDOH): HOW OFTEN DO YOU GET TOGETHER WITH FRIENDS OR RELATIVES?: ONCE A WEEK

## 2024-04-26 NOTE — PROGRESS NOTES
Prior to immunization administration, verified patients identity using patient s name and date of birth. Please see Immunization Activity for additional information.     Screening Questionnaire for Adult Immunization    Are you sick today?   No   Do you have allergies to medications, food, a vaccine component or latex?   No   Have you ever had a serious reaction after receiving a vaccination?   No   Do you have a long-term health problem with heart, lung, kidney, or metabolic disease (e.g., diabetes), asthma, a blood disorder, no spleen, complement component deficiency, a cochlear implant, or a spinal fluid leak?  Are you on long-term aspirin therapy?   No   Do you have cancer, leukemia, HIV/AIDS, or any other immune system problem?   No   Do you have a parent, brother, or sister with an immune system problem?   No   In the past 3 months, have you taken medications that affect  your immune system, such as prednisone, other steroids, or anticancer drugs; drugs for the treatment of rheumatoid arthritis, Crohn s disease, or psoriasis; or have you had radiation treatments?   No   Have you had a seizure, or a brain or other nervous system problem?   No   During the past year, have you received a transfusion of blood or blood    products, or been given immune (gamma) globulin or antiviral drug?   No   For women: Are you pregnant or is there a chance you could become       pregnant during the next month?   No   Have you received any vaccinations in the past 4 weeks?   No     Immunization questionnaire answers were all negative.      Patient instructed to remain in clinic for 15 minutes afterwards, and to report any adverse reactions.     Screening performed by Lisa Daniels MA on 4/26/2024 at 11:36 AM.

## 2024-04-26 NOTE — PROGRESS NOTES
Preventive Care Visit  [facility]  Zoraida ANGI Coffman CNP, Internal Medicine - Pediatrics  Apr 26, 2024      Assessment & Plan     Routine general medical examination at a health care facility  - REVIEW OF HEALTH MAINTENANCE PROTOCOL ORDERS    Visit for screening mammogram  - MA SCREENING DIGITAL BILAT - Future  (s+30)    Cervical cancer screening  - Pap screen with HPV - recommended age 30 - 65 years  - HPV Hold (Lab Only)  - HPV High Risk Types DNA Cervical    Primary hypertension  HTN Plan:  1)  Medication: begin: lisinopril   2)  Dietary sodium restriction  3)  Regular aerobic exercise  4)  Recheck in 2 weeks,BP check only  sooner should new symptoms or   problems arise.  5) See todays orders.    Patient Education: Reviewed risks of hypertension and principles of   treatment.    - lisinopril (ZESTRIL) 20 MG tablet  Dispense: 90 tablet; Refill: 3  - Albumin Random Urine Quantitative with Creat Ratio  - Comprehensive metabolic panel  - Albumin Random Urine Quantitative with Creat Ratio  - Comprehensive metabolic panel    CARDIOVASCULAR SCREENING; LDL GOAL LESS THAN 130  - Lipid panel reflex to direct LDL Fasting    Screening for diabetes mellitus (DM)  - Comprehensive metabolic panel    Screening for disorder of blood and blood-forming organs  - CBC with platelets    Screening for thyroid disorder  - TSH with free T4 reflex    Social anxiety disorder  Discussed the pathophysiology of anxiety episodes and the various symptoms seen associated with anxiety episodes.  Discussed possible triggers including fatigue, depression, stress, and chemicals such as alcohol, caffeine and certain drugs.  Discussed the treatment including an aerobic exercise program, adequate rest, and both rescue meds and maintenance meds.  Will Start:  - buPROPion (WELLBUTRIN XL) 300 MG 24 hr tablet  Dispense: 90 tablet; Refill: 3    Major depression in remission (H24)  Initiate medication with Wellbutrin    Reviewed concept of depression as function of biochemical imbalance of neurotransmitters/rationale for treatment.  Risks and benefits of medication(s) reviewed with patient.  Questions answered.  Counseling advised  Followup appointment in 1 month(s)  Patient instructed to call for significant side effects medications or problems  Patient advised immediate presentation to hospital for suicidal thought, etc.    - buPROPion (WELLBUTRIN XL) 300 MG 24 hr tablet  Dispense: 90 tablet; Refill: 3    Rosacea  Refill completed.   - doxycycline hyclate (VIBRAMYCIN) 100 MG capsule  Dispense: 90 capsule; Refill: 0    Need for Tdap vaccination  - TDAP 7+ (ADACEL,BOOSTRIX)    Menopause ovarian failure  - DX Bone Density    Alcoholism (H)  Social History    Substance and Sexual Activity      Alcohol use: Yes        Alcohol/week: 42.0 standard drinks of alcohol        Types: 42 Cans of beer per week  Recommendations:   cut down on alcohol use    Patient has been advised of split billing requirements and indicates understanding: Yes  Review of external notes as documented elsewhere in note  Ordering of each unique test  Prescription drug management   Time spent by me doing chart review, history and exam, documentation and further activities per the note      Discussed the following ways the patient can remain in a safe environment:    Counseling  Appropriate preventive services were discussed with this patient, including applicable screening as appropriate for fall prevention, nutrition, physical activity, Tobacco-use cessation, weight loss and cognition.  Checklist reviewing preventive services available has been given to the patient.  Reviewed patient's diet, addressing concerns and/or questions.   She is at risk for lack of exercise and has been provided with information to increase physical activity for the benefit of her well-being.   The patient was instructed to see the dentist every 6 months.   She is at risk for  psychosocial distress and has been provided with information to reduce risk.   The patient reports drinking more than one alcoholic drink per day and sometimes engages in binge or excessive drinking. The patient was counseled and given information about possible harmful effects of excessive alcohol intake as well as where to get help for alcohol problems. The patient's PHQ-9 score is consistent with mild depression. She was provided with information regarding depression.       FUTURE APPOINTMENTS:       - Follow-up for annual visit or as needed  See Patient Instructions  Symptomatic therapy suggested: will restart lisinopril   Will restart Wellbutrin .   Make appointment for blood pressure check only  in 1 month   FURTHER TESTING:       - mammogram  Will follow up and/or notify patient of  results via My Chart to determine further need for followup   Follow up office visit in one year for annual health maintenance exam, sooner PRN.   Patient needs to follow up in if no improvement,or sooner if worsening of symptoms or other symptoms develop.  Trudy Palma is a 58 year old, presenting for the following:  Physical (Annual exam, fasting for any labs needed today)        4/26/2024    10:36 AM   Additional Questions   Roomed by Lisa MAYS   Accompanied by Self         4/26/2024    10:36 AM   Patient Reported Additional Medications   Patient reports taking the following new medications None        Health Care Directive  Patient does not have a Health Care Directive or Living Will: Discussed advance care planning with patient; information given to patient to review.    HPI    Hypertension Follow-up    Do you check your blood pressure regularly outside of the clinic? Yes   Are you following a low salt diet? Yes  Are your blood pressures ever more than 140 on the top number (systolic) OR more   than 90 on the bottom number (diastolic), for example 140/90? Yes  Did tolerate the medication but has not been on for at  least 2 years         4/26/2024   General Health   How would you rate your overall physical health? (!) FAIR   Feel stress (tense, anxious, or unable to sleep) Only a little   (!) STRESS CONCERN      4/26/2024   Nutrition   Three or more servings of calcium each day? Yes   Diet: I don't know   How many servings of fruit and vegetables per day? (!) 2-3   How many sweetened beverages each day? 0-1         4/26/2024   Exercise   Days per week of moderate/strenous exercise 1 day   (!) EXERCISE CONCERN      4/26/2024   Social Factors   Frequency of gathering with friends or relatives Once a week   Worry food won't last until get money to buy more No   Food not last or not have enough money for food? No   Do you have housing?  Yes   Are you worried about losing your housing? No   Lack of transportation? No   Unable to get utilities (heat,electricity)? No         4/26/2024   Fall Risk   Fallen 2 or more times in the past year? No   Trouble with walking or balance? No          4/26/2024   Dental   Dentist two times every year? (!) NO         4/26/2024   TB Screening   Were you born outside of the [place]? No       Today's PHQ-9 Score:       4/26/2024    10:25 AM   PHQ-9 SCORE   PHQ-9 Total Score MyChart 8 (Mild depression)   PHQ-9 Total Score 8         4/26/2024   Substance Use   Alcohol more than 3/day or more than 7/wk Yes   How often do you have a drink containing alcohol 4 or more times a week   How many alcohol drinks on typical day 5 or 6   How often do you have 5+ drinks at one occasion Daily or almost daily   Audit 2/3 Score 6   How often not able to stop drinking once started Never   How often failed to do what normally expected Never   How often needed first drink in am after a heavy drinking session Never   How often feeling of guilt or remorse after drinking Monthly   How often unable to remember what happened the night before Monthly   Have you or someone else been injured because of your drinking No   Has anyone  been concerned or suggested you cut down on drinking Yes, but not in the last year   TOTAL SCORE - AUDIT 16   Do you use any other substances recreationally? (!) ALCOHOL     Social History     Tobacco Use    Smoking status: Never    Smokeless tobacco: Never   Vaping Use    Vaping status: Never Used   Substance Use Topics    Alcohol use: Yes     Alcohol/week: 42.0 standard drinks of alcohol     Types: 42 Cans of beer per week    Drug use: No             4/26/2024   Breast Cancer Screening   Family history of breast, colon, or ovarian cancer? No / Unknown      Mammogram Screening - Mammogram every 1-2 years updated in Health Maintenance based on mutual decision making        4/26/2024   STI Screening   New sexual partner(s) since last STI/HIV test? No     History of abnormal Pap smear: NO - age 30-65 PAP every 5 years with negative HPV co-testing recommended        Latest Ref Rng & Units 4/26/2024    11:04 AM 5/14/2021    10:10 AM 5/14/2021     9:52 AM   PAP / HPV   PAP  Negative for Intraepithelial Lesion or Malignancy (NILM)      PAP (Historical)    NIL    HPV 16 DNA Negative Negative  Negative     HPV 18 DNA Negative Negative  Negative     Other HR HPV Negative Negative  Negative       ASCVD Risk   The 10-year ASCVD risk score (Franklin NATARAJAN, et al., 2019) is: 3.5%    Values used to calculate the score:      Age: 58 years      Sex: Female      Is Non- : No      Diabetic: No      Tobacco smoker: No      Systolic Blood Pressure: 159 mmHg      Is BP treated: No      HDL Cholesterol: 81 mg/dL      Total Cholesterol: 239 mg/dL           Reviewed and updated as needed this visit by Provider                    Past Medical History:   Diagnosis Date    Alcohol dependence (H)     Alcoholism (H) 2/21/2009    Depression with anxiety 1/19/2016    Hypertension 2/20/2009    Major depression in remission (H24) 2/20/2009     Past Surgical History:   Procedure Laterality Date    COLONOSCOPY WITH CO2  INSUFFLATION N/A 4/14/2017    Procedure: COLONOSCOPY WITH CO2 INSUFFLATION;  Surgeon: Duane, William Charles, MD;  Location: [facility] OR    NO HISTORY OF SURGERY       Lab work is in process  Labs reviewed in EPIC  BP Readings from Last 3 Encounters:   04/26/24 (!) 159/91   03/10/23 130/89   06/10/21 126/85    Wt Readings from Last 3 Encounters:   04/26/24 56.5 kg (124 lb 9.6 oz)   03/10/23 53.6 kg (118 lb 1.6 oz)   06/10/21 56.2 kg (123 lb 12.8 oz)                  Patient Active Problem List   Diagnosis    Social anxiety disorder    Alcoholism (H)    Hypertension    Major depression in remission (H24)    Cervical cancer screening    Rosacea    Persistent depressive disorder     Past Surgical History:   Procedure Laterality Date    COLONOSCOPY WITH CO2 INSUFFLATION N/A 4/14/2017    Procedure: COLONOSCOPY WITH CO2 INSUFFLATION;  Surgeon: Duane, William Charles, MD;  Location: [facility] OR    NO HISTORY OF SURGERY         Social History     Tobacco Use    Smoking status: Never    Smokeless tobacco: Never   Substance Use Topics    Alcohol use: Yes     Alcohol/week: 42.0 standard drinks of alcohol     Types: 42 Cans of beer per week     Family History   Problem Relation Age of Onset    Hypertension Father     Depression Father     Substance Abuse Father     Depression Brother     Cancer Maternal Aunt     Other Cancer Maternal Aunt     Diabetes No family hx of     Coronary Artery Disease No family hx of     Hyperlipidemia No family hx of     Cerebrovascular Disease No family hx of     Breast Cancer No family hx of     Colon Cancer No family hx of     Asthma No family hx of     Thyroid Disease No family hx of          Current Outpatient Medications   Medication Sig Dispense Refill    buPROPion (WELLBUTRIN XL) 300 MG 24 hr tablet Take 1 tablet (300 mg) by mouth every morning 90 tablet 3    doxycycline hyclate (VIBRAMYCIN) 100 MG capsule Take 1 capsule (100 mg) by mouth daily 90 capsule 0    lisinopril (ZESTRIL) 20 MG tablet Take 1  "tablet (20 mg) by mouth daily 90 tablet 3     Allergies   Allergen Reactions    Other [Seasonal Allergies] Rash and Blisters     Hair color        CONSTITUTIONAL:NEGATIVE for fever, chills, change in weight  INTEGUMENTARY/SKIN: NEGATIVE for worrisome rashes, moles or lesions  EYES: NEGATIVE for vision changes or irritation  ENT: NEGATIVE for ear, mouth and throat problems  RESP:NEGATIVE for significant cough or SOB  BREAST: NEGATIVE for masses, tenderness or discharge  CV: NEGATIVE for chest pain, palpitations or peripheral edema and POSITIVE for HX HTN  GI: NEGATIVE for nausea, abdominal pain, heartburn, or change in bowel habits   menopausal female: amenorrhea, no unusual urinary symptoms, and no unusual vaginal symptoms  MUSCULOSKELETAL:NEGATIVE for significant arthralgias or myalgia  NEURO: NEGATIVE for weakness, dizziness or paresthesias  ENDOCRINE: NEGATIVE for temperature intolerance, skin/hair changes  HEME/ALLERGY/IMMUNE: NEGATIVE for bleeding problems  PSYCHIATRIC: POSITIVE forHx anxiety, Hx depression, and was on Wellbutrin . Stopped in September and did ok for a while but now feels like could benefit from going back on  and NEGATIVE forthoughts of hurting someone else and thoughts of self harm          Objective    Exam  BP (!) 159/91 (BP Location: Right arm, Patient Position: Sitting, Cuff Size: Adult Regular)   Pulse 74   Temp 97.7  F (36.5  C) (Oral)   Resp 12   Ht 1.638 m (5' 4.5\")   Wt 56.5 kg (124 lb 9.6 oz)   SpO2 100%   BMI 21.06 kg/m     Estimated body mass index is 21.06 kg/m  as calculated from the following:    Height as of this encounter: 1.638 m (5' 4.5\").    Weight as of this encounter: 56.5 kg (124 lb 9.6 oz).    Physical Exam  GENERAL: alert and no distress  EYES: Eyes grossly normal to inspection and conjunctivae and sclerae normal  HENT: ear canals and TM's normal, nose and mouth without ulcers or lesions  NECK: no adenopathy, no asymmetry, masses, or scars  RESP: lungs clear " to auscultation - no rales, rhonchi or wheezes  BREAST: normal without masses, tenderness or nipple discharge and no palpable axillary masses or adenopathy  CV: regular rates and rhythm, no murmur, click or rub, peripheral pulses strong, and no peripheral edema  ABDOMEN: soft, nontender, no hepatosplenomegaly, no masses and bowel sounds normal   (female): normal female external genitalia, normal urethral meatus , normal vaginal mucosa, and normal cervix, adnexae, and uterus without masses.  MS: no gross musculoskeletal defects noted, no edema  SKIN: no suspicious lesions or rashes  NEURO: Normal strength and tone, mentation intact and speech normal  PSYCH: mentation appears normal, affect normal/bright  LYMPH: no cervical, supraclavicular, axillary, or inguinal adenopathy        Signed Electronically by: ANGI Simon CNP

## 2024-04-26 NOTE — PATIENT INSTRUCTIONS
PLAN:   1.   Symptomatic therapy suggested: will restart lisinopril   Will restart Wellbutrin .  Increase calcium to 1000mg and 1000iu Vit D   2.  Orders Placed This Encounter   Medications    lisinopril (ZESTRIL) 20 MG tablet     Sig: Take 1 tablet (20 mg) by mouth daily     Dispense:  90 tablet     Refill:  3    buPROPion (WELLBUTRIN XL) 300 MG 24 hr tablet     Sig: Take 1 tablet (300 mg) by mouth every morning     Dispense:  90 tablet     Refill:  3    doxycycline hyclate (VIBRAMYCIN) 100 MG capsule     Sig: Take 1 capsule (100 mg) by mouth daily     Dispense:  90 capsule     Refill:  0     Orders Placed This Encounter   Procedures    REVIEW OF HEALTH MAINTENANCE PROTOCOL ORDERS    MA SCREENING DIGITAL BILAT - Future  (s+30)    DX Bone Density    TDAP 7+ (ADACEL,BOOSTRIX)    Lipid panel reflex to direct LDL Fasting    Albumin Random Urine Quantitative with Creat Ratio    CBC with platelets    Comprehensive metabolic panel    TSH with free T4 reflex    HPV High Risk Types DNA Cervical    Pap screen with HPV - recommended age 30 - 65 years    HPV Hold (Lab Only)       3. Make appointment for blood pressure check only  in 1 month   FURTHER TESTING:       - mammogram  Will follow up and/or notify patient of  results via My Chart to determine further need for followup   Follow up office visit in one year for annual health maintenance exam, sooner PRN.   Patient needs to follow up in if no improvement,or sooner if worsening of symptoms or other symptoms develop.        Preventive Care Advice   This is general advice given by our system to help you stay healthy. However, your care team may have specific advice just for you. Please talk to your care team about your preventive care needs.  Nutrition  Eat 5 or more servings of fruits and vegetables each day.  Try wheat bread, brown rice and whole grain pasta (instead of white bread, rice, and pasta).  Get enough calcium and vitamin D. Check the label on foods and aim for  100% of the RDA (recommended daily allowance).  Lifestyle  Exercise at least 150 minutes each week   (30 minutes a day, 5 days a week).  Do muscle strengthening activities 2 days a week. These help control your weight and prevent disease.  No smoking.  Wear sunscreen to prevent skin cancer.  Have a dental exam and cleaning every 6 months.  Yearly exams  See your health care team every year to talk about:  Any changes in your health.  Any medicines your care team has prescribed.  Preventive care, family planning, and ways to prevent chronic diseases.  Shots (vaccines)   HPV shots (up to age 26), if you've never had them before.  Hepatitis B shots (up to age 59), if you've never had them before.  COVID-19 shot: Get this shot when it's due.  Flu shot: Get a flu shot every year.  Tetanus shot: Get a tetanus shot every 10 years.  Pneumococcal, hepatitis A, and RSV shots: Ask your care team if you need these based on your risk.  Shingles shot (for age 50 and up).  General health tests  Diabetes screening:  Starting at age 35, Get screened for diabetes at least every 3 years.  If you are younger than age 35, ask your care team if you should be screened for diabetes.  Cholesterol test: At age 39, start having a cholesterol test every 5 years, or more often if advised.  Bone density scan (DEXA): At age 50, ask your care team if you should have this scan for osteoporosis (brittle bones).  Hepatitis C: Get tested at least once in your life.  STIs (sexually transmitted infections)  Before age 24: Ask your care team if you should be screened for STIs.  After age 24: Get screened for STIs if you're at risk. You are at risk for STIs (including HIV) if:  You are sexually active with more than one person.  You don't use condoms every time.  You or a partner was diagnosed with a sexually transmitted infection.  If you are at risk for HIV, ask about PrEP medicine to prevent HIV.  Get tested for HIV at least once in your life, whether  you are at risk for HIV or not.  Cancer screening tests  Cervical cancer screening: If you have a cervix, begin getting regular cervical cancer screening tests at age 21. Most people who have regular screenings with normal results can stop after age 65. Talk about this with your provider.  Breast cancer scan (mammogram): If you've ever had breasts, begin having regular mammograms starting at age 40. This is a scan to check for breast cancer.  Colon cancer screening: It is important to start screening for colon cancer at age 45.  Have a colonoscopy test every 10 years (or more often if you're at risk) Or, ask your provider about stool tests like a FIT test every year or Cologuard test every 3 years.  To learn more about your testing options, visit: [email or web address].  For help making a decision, visit: [email or web address].  Prostate cancer screening test: If you have a prostate and are age 55 to 69, ask your provider if you would benefit from a yearly prostate cancer screening test.  Lung cancer screening: If you are a current or former smoker age 50 to 80, ask your care team if ongoing lung cancer screenings are right for you.  For informational purposes only. Not to replace the advice of your health care provider. Copyright   2023 [facility]. All rights reserved. Clinically reviewed by the [facility] Transitions Program. StatSheet 875275 - REV 01/24.    Learning About Stress  What is stress?     Stress is your body's response to a hard situation. Your body can have a physical, emotional, or mental response. Stress is a fact of life for most people, and it affects everyone differently. What causes stress for you may not be stressful for someone else.  A lot of things can cause stress. You may feel stress when you go on a job interview, take a test, or run a race. This kind of short-term stress is normal and even useful. It can help you if you need to work hard or react  quickly. For example, stress can help you finish an important job on time.  Long-term stress is caused by ongoing stressful situations or events. Examples of long-term stress include long-term health problems, ongoing problems at work, or conflicts in your family. Long-term stress can harm your health.  How does stress affect your health?  When you are stressed, your body responds as though you are in danger. It makes hormones that speed up your heart, make you breathe faster, and give you a burst of energy. This is called the fight-or-flight stress response. If the stress is over quickly, your body goes back to normal and no harm is done.  But if stress happens too often or lasts too long, it can have bad effects. Long-term stress can make you more likely to get sick, and it can make symptoms of some diseases worse. If you tense up when you are stressed, you may develop neck, shoulder, or low back pain. Stress is linked to high blood pressure and heart disease.  Stress also harms your emotional health. It can make you duarte, tense, or depressed. Your relationships may suffer, and you may not do well at work or school.  What can you do to manage stress?  You can try these things to help manage stress:   Do something active. Exercise or activity can help reduce stress. Walking is a great way to get started. Even everyday activities such as housecleaning or yard work can help.  Try yoga or natanael chi. These techniques combine exercise and meditation. You may need some training at first to learn them.  Do something you enjoy. For example, listen to music or go to a movie. Practice your hobby or do volunteer work.  Meditate. This can help you relax, because you are not worrying about what happened before or what may happen in the future.  Do guided imagery. Imagine yourself in any setting that helps you feel calm. You can use online videos, books, or a teacher to guide you.  Do breathing exercises. For example:  From a  "standing position, bend forward from the waist with your knees slightly bent. Let your arms dangle close to the floor.  Breathe in slowly and deeply as you return to a standing position. Roll up slowly and lift your head last.  Hold your breath for just a few seconds in the standing position.  Breathe out slowly and bend forward from the waist.  Let your feelings out. Talk, laugh, cry, and express anger when you need to. Talking with supportive friends or family, a counselor, or a matteo leader about your feelings is a healthy way to relieve stress. Avoid discussing your feelings with people who make you feel worse.  Write. It may help to write about things that are bothering you. This helps you find out how much stress you feel and what is causing it. When you know this, you can find better ways to cope.  What can you do to prevent stress?  You might try some of these things to help prevent stress:  Manage your time. This helps you find time to do the things you want and need to do.  Get enough sleep. Your body recovers from the stresses of the day while you are sleeping.  Get support. Your family, friends, and community can make a difference in how you experience stress.  Limit your news feed. Avoid or limit time on social media or news that may make you feel stressed.  Do something active. Exercise or activity can help reduce stress. Walking is a great way to get started.  Where can you learn more?  Go to https://www.EverZero.net/patiented  Enter N032 in the search box to learn more about \"Learning About Stress.\"  Current as of: October 24, 2023               Content Version: 14.0    4324-0238 [employer].   Care instructions adapted under license by your healthcare professional. If you have questions about a medical condition or this instruction, always ask your healthcare professional. [employer] disclaims any warranty or liability for your use of this information.      Learning " About Depression Screening  What is depression screening?  Depression screening is a way to see if you have depression symptoms. It may be done by a doctor or counselor. It's often part of a routine checkup. That's because your mental health is just as important as your physical health.  Depression is a mental health condition that affects how you feel, think, and act. You may:  Have less energy.  Lose interest in your daily activities.  Feel sad and grouchy for a long time.  Depression is very common. It affects people of all ages.  Many things can lead to depression. Some people become depressed after they have a stroke or find out they have a major illness like cancer or heart disease. The death of a loved one or a breakup may lead to depression. It can run in families. Most experts believe that a combination of inherited genes and stressful life events can cause it.  What happens during screening?  You may be asked to fill out a form about your depression symptoms. You and the doctor will discuss your answers. The doctor may ask you more questions to learn more about how you think, act, and feel.  What happens after screening?  If you have symptoms of depression, your doctor will talk to you about your options.  Doctors usually treat depression with medicines or counseling. Often, combining the two works best. Many people don't get help because they think that they'll get over the depression on their own. But people with depression may not get better unless they get treatment.  The cause of depression is not well understood. There may be many factors involved. But if you have depression, it's not your fault.  A serious symptom of depression is thinking about death or suicide. If you or someone you care about talks about this or about feeling hopeless, get help right away.  It's important to know that depression can be treated. Medicine, counseling, and self-care may help.  Where can you learn more?  Go to  "https://www.Hu[employer]y.net/patiented  Enter T185 in the search box to learn more about \"Learning About Depression Screening.\"  Current as of: June 24, 2023               Content Version: 14.0    6076-1547 [employer].   Care instructions adapted under license by your healthcare professional. If you have questions about a medical condition or this instruction, always ask your healthcare professional. [employer] disclaims any warranty or liability for your use of this information.      Substance Use Disorder: Care Instructions  Overview     You can improve your life and health by stopping your use of alcohol or drugs. When you don't drink or use drugs, you may feel and sleep better. You may get along better with your family, friends, and coworkers. There are medicines and programs that can help with substance use disorder.  How can you care for yourself at home?  Here are some ways to help you stay sober and prevent relapse.  If you have been given medicine to help keep you sober or reduce your cravings, be sure to take it exactly as prescribed.  Talk to your doctor about programs that can help you stop using drugs or drinking alcohol.  Do not keep alcohol or drugs in your home.  Plan ahead. Think about what you'll say if other people ask you to drink or use drugs. Try not to spend time with people who drink or use drugs.  Use the time and money spent on drinking or drugs to do something that's important to you.  Preventing a relapse  Have a plan to deal with relapse. Learn to recognize changes in your thinking that lead you to drink or use drugs. Get help before you start to drink or use drugs again.  Try to stay away from situations, friends, or places that may lead you to drink or use drugs.  If you feel the need to drink alcohol or use drugs again, seek help right away. Call a trusted friend or family member. Some people get support from organizations such as Narcotics Anonymous or [facility] " Recovery or from treatment facilities.  If you relapse, get help as soon as you can. Some people make a plan with another person that outlines what they want that person to do for them if they relapse. The plan usually includes how to handle the relapse and who to notify in case of relapse.  Don't give up. Remember that a relapse doesn't mean that you have failed. Use the experience to learn the triggers that lead you to drink or use drugs. Then quit again. Recovery is a lifelong process. Many people have several relapses before they are able to quit for good.  Follow-up care is a key part of your treatment and safety. Be sure to make and go to all appointments, and call your doctor if you are having problems. It's also a good idea to know your test results and keep a list of the medicines you take.  When should you call for help?   Call 911  anytime you think you may need emergency care. For example, call if you or someone else:    Has overdosed or has withdrawal signs. Be sure to tell the emergency workers that you are or someone else is using or trying to quit using drugs. Overdose or withdrawal signs may include:  Losing consciousness.  Seizure.  Seeing or hearing things that aren't there (hallucinations).     Is thinking or talking about suicide or harming others.   Where to get help 24 hours a day, 7 days a week   If you or someone you know talks about suicide, self-harm, a mental health crisis, a substance use crisis, or any other kind of emotional distress, get help right away. You can:    Call the Suicide and Crisis Lifeline at 988.     Call 0-901-211-TALK (1-732.206.2013).     Text HOME to 725453 to access the Crisis Text Line.   Consider saving these numbers in your phone.  Go to [employer] Check for more information or to chat online.  Call your doctor now or seek immediate medical care if:    You are having withdrawal symptoms. These may include nausea or vomiting, sweating, shakiness, and anxiety.  "  Watch closely for changes in your health, and be sure to contact your doctor if:    You have a relapse.     You need more help or support to stop.   Where can you learn more?  Go to https://www.[employer].net/patiented  Enter H573 in the search box to learn more about \"Substance Use Disorder: Care Instructions.\"  Current as of: November 15, 2023               Content Version: 14.0    8096-4488 [employer].   Care instructions adapted under license by your healthcare professional. If you have questions about a medical condition or this instruction, always ask your healthcare professional. [employer] disclaims any warranty or liability for your use of this information.      "

## 2024-04-27 LAB
ALBUMIN SERPL BCG-MCNC: 4.7 G/DL (ref 3.5–5.2)
ALP SERPL-CCNC: 68 U/L (ref 40–150)
ALT SERPL W P-5'-P-CCNC: 18 U/L (ref 0–50)
ANION GAP SERPL CALCULATED.3IONS-SCNC: 11 MMOL/L (ref 7–15)
AST SERPL W P-5'-P-CCNC: 21 U/L (ref 0–45)
BILIRUB SERPL-MCNC: 0.6 MG/DL
BUN SERPL-MCNC: 8.6 MG/DL (ref 6–20)
CALCIUM SERPL-MCNC: 9.6 MG/DL (ref 8.6–10)
CHLORIDE SERPL-SCNC: 95 MMOL/L (ref 98–107)
CHOLEST SERPL-MCNC: 251 MG/DL
CREAT SERPL-MCNC: 0.54 MG/DL (ref 0.51–0.95)
CREAT UR-MCNC: 16 MG/DL
DEPRECATED HCO3 PLAS-SCNC: 28 MMOL/L (ref 22–29)
EGFRCR SERPLBLD CKD-EPI 2021: >90 ML/MIN/1.73M2
FASTING STATUS PATIENT QL REPORTED: YES
GLUCOSE SERPL-MCNC: 101 MG/DL (ref 70–99)
HDLC SERPL-MCNC: 93 MG/DL
LDLC SERPL CALC-MCNC: 147 MG/DL
MICROALBUMIN UR-MCNC: <12 MG/L
MICROALBUMIN/CREAT UR: NORMAL MG/G{CREAT}
NONHDLC SERPL-MCNC: 158 MG/DL
POTASSIUM SERPL-SCNC: 4.9 MMOL/L (ref 3.4–5.3)
PROT SERPL-MCNC: 7.3 G/DL (ref 6.4–8.3)
SODIUM SERPL-SCNC: 134 MMOL/L (ref 135–145)
TRIGL SERPL-MCNC: 53 MG/DL
TSH SERPL DL<=0.005 MIU/L-ACNC: 1.02 UIU/ML (ref 0.3–4.2)

## 2024-04-27 NOTE — RESULT ENCOUNTER NOTE
Emiliano Rodríguez,    Attached are your test results.  -Normal red blood cell (hgb) levels, normal white blood cell count and normal platelet levels.   Please contact us if you have any questions.    Zoraida Leal, CNP

## 2024-04-28 NOTE — RESULT ENCOUNTER NOTE
Emiliano Rodríguez,    Attached are your test results.  -LDL(bad) cholesterol level is elevated which can increase your heart disease risk.  A diet high in fat and simple carbohydrates, genetics and being overweight can contribute to this. ADVISE: exercising 150 minutes of aerobic exercise per week (30 minutes for 5 days per week or 50 minutes for 3 days per week are options) and eating a low saturated fat/low carbohydrate diet are helpful to improve this. Current guidelines from the American Heart Association support starting a cholesterol lowering medication to lower your heart and stroke disease risk. I would like to start you on cholesterol medications how do you feel about that?  -Liver and gallbladder tests (ALT,AST, Alk phos,bilirubin) are normal.  -Kidney function (GFR) is normal.  -Sodium is decreased.  ADVISE: rechecking this in 1 month.  -Potassium is normal.  -Calcium is normal.  -Glucose is slight elevated and may be a sign of early diabetes (prediabetes). ADVISE:: eating a low carbohydrate diet, exercising, trying to lose weight (if necessary) and rechecking your glucose level in 12 months.  -TSH (thyroid stimulating hormone) level is normal which indicates normal thyroid function.  -Microalbumin (urine protein) test is normal.  ADVISE: rechecking this annually.   Please contact us if you have any questions.    Zoraida Leal, CNP

## 2024-04-30 LAB
BKR LAB AP GYN ADEQUACY: NORMAL
BKR LAB AP GYN INTERPRETATION: NORMAL
BKR LAB AP HPV REFLEX: NORMAL
BKR LAB AP PREVIOUS ABNORMAL: NORMAL
PATH REPORT.COMMENTS IMP SPEC: NORMAL
PATH REPORT.COMMENTS IMP SPEC: NORMAL
PATH REPORT.RELEVANT HX SPEC: NORMAL

## 2024-05-02 LAB
HUMAN PAPILLOMA VIRUS 16 DNA: NEGATIVE
HUMAN PAPILLOMA VIRUS 18 DNA: NEGATIVE
HUMAN PAPILLOMA VIRUS FINAL DIAGNOSIS: NORMAL
HUMAN PAPILLOMA VIRUS OTHER HR: NEGATIVE

## 2024-08-08 DIAGNOSIS — L71.9 ROSACEA: ICD-10-CM

## 2024-08-08 RX ORDER — DOXYCYCLINE 100 MG/1
100 CAPSULE ORAL DAILY
Qty: 90 CAPSULE | Refills: 3 | Status: SHIPPED | OUTPATIENT
Start: 2024-08-08

## 2024-11-03 ASSESSMENT — PATIENT HEALTH QUESTIONNAIRE - PHQ9: SUM OF ALL RESPONSES TO PHQ QUESTIONS 1-9: 5

## 2025-03-27 ENCOUNTER — PATIENT OUTREACH (OUTPATIENT)
Dept: CARE COORDINATION | Facility: CLINIC | Age: 60
End: 2025-03-27
Payer: COMMERCIAL

## 2025-04-10 ENCOUNTER — PATIENT OUTREACH (OUTPATIENT)
Dept: CARE COORDINATION | Facility: CLINIC | Age: 60
End: 2025-04-10
Payer: COMMERCIAL

## 2025-06-01 ENCOUNTER — HEALTH MAINTENANCE LETTER (OUTPATIENT)
Age: 60
End: 2025-06-01

## 2025-07-03 DIAGNOSIS — I10 PRIMARY HYPERTENSION: ICD-10-CM

## 2025-07-03 DIAGNOSIS — F32.5 MAJOR DEPRESSION IN REMISSION: ICD-10-CM

## 2025-07-03 DIAGNOSIS — F40.10 SOCIAL ANXIETY DISORDER: ICD-10-CM

## 2025-07-03 RX ORDER — LISINOPRIL 20 MG/1
20 TABLET ORAL DAILY
Qty: 90 TABLET | Refills: 0 | Status: SHIPPED | OUTPATIENT
Start: 2025-07-03

## 2025-07-03 RX ORDER — BUPROPION HYDROCHLORIDE 300 MG/1
300 TABLET ORAL EVERY MORNING
Qty: 90 TABLET | Refills: 0 | Status: SHIPPED | OUTPATIENT
Start: 2025-07-03

## 2025-07-03 NOTE — TELEPHONE ENCOUNTER
Refill completed.    Needs in person appointment scheduled before further refills  OK to do with physical and OK to use same day slot

## (undated) DEVICE — SOL WATER IRRIG 1000ML BOTTLE 07139-09